# Patient Record
Sex: MALE | Race: WHITE | NOT HISPANIC OR LATINO | Employment: FULL TIME | URBAN - METROPOLITAN AREA
[De-identification: names, ages, dates, MRNs, and addresses within clinical notes are randomized per-mention and may not be internally consistent; named-entity substitution may affect disease eponyms.]

---

## 2021-03-30 ENCOUNTER — OFFICE VISIT (OUTPATIENT)
Dept: FAMILY MEDICINE | Facility: CLINIC | Age: 36
End: 2021-03-30
Payer: COMMERCIAL

## 2021-03-30 VITALS
HEIGHT: 70 IN | SYSTOLIC BLOOD PRESSURE: 170 MMHG | WEIGHT: 204 LBS | HEART RATE: 81 BPM | TEMPERATURE: 99 F | BODY MASS INDEX: 29.2 KG/M2 | DIASTOLIC BLOOD PRESSURE: 96 MMHG | OXYGEN SATURATION: 98 %

## 2021-03-30 DIAGNOSIS — F90.0 ATTENTION DEFICIT HYPERACTIVITY DISORDER (ADHD), PREDOMINANTLY INATTENTIVE TYPE: ICD-10-CM

## 2021-03-30 PROCEDURE — 99214 PR OFFICE/OUTPT VISIT, EST, LEVL IV, 30-39 MIN: ICD-10-PCS | Mod: S$GLB,,, | Performed by: FAMILY MEDICINE

## 2021-03-30 PROCEDURE — 99214 OFFICE O/P EST MOD 30 MIN: CPT | Mod: S$GLB,,, | Performed by: FAMILY MEDICINE

## 2021-03-30 RX ORDER — DEXTROAMPHETAMINE SACCHARATE, AMPHETAMINE ASPARTATE MONOHYDRATE, DEXTROAMPHETAMINE SULFATE AND AMPHETAMINE SULFATE 7.5; 7.5; 7.5; 7.5 MG/1; MG/1; MG/1; MG/1
30 CAPSULE, EXTENDED RELEASE ORAL EVERY MORNING
Qty: 30 CAPSULE | Refills: 0 | Status: SHIPPED | OUTPATIENT
Start: 2021-05-28 | End: 2021-05-21

## 2021-03-30 RX ORDER — LISINOPRIL 10 MG/1
10 TABLET ORAL DAILY
COMMUNITY
End: 2021-10-21 | Stop reason: SDUPTHER

## 2021-03-30 RX ORDER — DEXTROAMPHETAMINE SACCHARATE, AMPHETAMINE ASPARTATE MONOHYDRATE, DEXTROAMPHETAMINE SULFATE AND AMPHETAMINE SULFATE 7.5; 7.5; 7.5; 7.5 MG/1; MG/1; MG/1; MG/1
30 CAPSULE, EXTENDED RELEASE ORAL EVERY MORNING
Qty: 30 CAPSULE | Refills: 0 | Status: SHIPPED | OUTPATIENT
Start: 2021-04-29 | End: 2021-05-21

## 2021-03-30 RX ORDER — DEXTROAMPHETAMINE SACCHARATE, AMPHETAMINE ASPARTATE MONOHYDRATE, DEXTROAMPHETAMINE SULFATE AND AMPHETAMINE SULFATE 7.5; 7.5; 7.5; 7.5 MG/1; MG/1; MG/1; MG/1
30 CAPSULE, EXTENDED RELEASE ORAL EVERY MORNING
Qty: 30 CAPSULE | Refills: 0 | Status: SHIPPED | OUTPATIENT
Start: 2021-03-30 | End: 2021-04-01 | Stop reason: SDUPTHER

## 2021-03-30 RX ORDER — DEXTROAMPHETAMINE SACCHARATE, AMPHETAMINE ASPARTATE MONOHYDRATE, DEXTROAMPHETAMINE SULFATE AND AMPHETAMINE SULFATE 7.5; 7.5; 7.5; 7.5 MG/1; MG/1; MG/1; MG/1
30 CAPSULE, EXTENDED RELEASE ORAL EVERY MORNING
COMMUNITY
End: 2021-03-30 | Stop reason: SDUPTHER

## 2021-04-01 ENCOUNTER — TELEPHONE (OUTPATIENT)
Dept: FAMILY MEDICINE | Facility: CLINIC | Age: 36
End: 2021-04-01

## 2021-04-01 RX ORDER — DEXTROAMPHETAMINE SACCHARATE, AMPHETAMINE ASPARTATE MONOHYDRATE, DEXTROAMPHETAMINE SULFATE AND AMPHETAMINE SULFATE 7.5; 7.5; 7.5; 7.5 MG/1; MG/1; MG/1; MG/1
30 CAPSULE, EXTENDED RELEASE ORAL 2 TIMES DAILY
Qty: 60 CAPSULE | Refills: 0 | Status: SHIPPED | OUTPATIENT
Start: 2021-04-01 | End: 2021-05-21

## 2021-04-01 RX ORDER — DEXTROAMPHETAMINE SACCHARATE, AMPHETAMINE ASPARTATE MONOHYDRATE, DEXTROAMPHETAMINE SULFATE AND AMPHETAMINE SULFATE 7.5; 7.5; 7.5; 7.5 MG/1; MG/1; MG/1; MG/1
30 CAPSULE, EXTENDED RELEASE ORAL 2 TIMES DAILY
Qty: 60 CAPSULE | Refills: 0 | Status: SHIPPED | OUTPATIENT
Start: 2021-05-31 | End: 2021-05-21

## 2021-04-01 RX ORDER — DEXTROAMPHETAMINE SACCHARATE, AMPHETAMINE ASPARTATE MONOHYDRATE, DEXTROAMPHETAMINE SULFATE AND AMPHETAMINE SULFATE 7.5; 7.5; 7.5; 7.5 MG/1; MG/1; MG/1; MG/1
30 CAPSULE, EXTENDED RELEASE ORAL 2 TIMES DAILY
Qty: 60 CAPSULE | Refills: 0 | Status: SHIPPED | OUTPATIENT
Start: 2021-06-30 | End: 2021-05-21

## 2021-05-05 DIAGNOSIS — Z11.59 NEED FOR HEPATITIS C SCREENING TEST: ICD-10-CM

## 2021-05-20 ENCOUNTER — TELEPHONE (OUTPATIENT)
Dept: FAMILY MEDICINE | Facility: CLINIC | Age: 36
End: 2021-05-20

## 2021-05-21 RX ORDER — DEXTROAMPHETAMINE SACCHARATE, AMPHETAMINE ASPARTATE, DEXTROAMPHETAMINE SULFATE AND AMPHETAMINE SULFATE 7.5; 7.5; 7.5; 7.5 MG/1; MG/1; MG/1; MG/1
30 TABLET ORAL 2 TIMES DAILY
Qty: 60 TABLET | Refills: 0 | Status: SHIPPED | OUTPATIENT
Start: 2021-05-21 | End: 2021-06-30 | Stop reason: SDUPTHER

## 2021-06-24 ENCOUNTER — TELEPHONE (OUTPATIENT)
Dept: FAMILY MEDICINE | Facility: CLINIC | Age: 36
End: 2021-06-24

## 2021-06-30 ENCOUNTER — OFFICE VISIT (OUTPATIENT)
Dept: FAMILY MEDICINE | Facility: CLINIC | Age: 36
End: 2021-06-30
Payer: COMMERCIAL

## 2021-06-30 VITALS
TEMPERATURE: 98 F | DIASTOLIC BLOOD PRESSURE: 88 MMHG | WEIGHT: 202 LBS | HEART RATE: 76 BPM | BODY MASS INDEX: 28.92 KG/M2 | SYSTOLIC BLOOD PRESSURE: 138 MMHG | HEIGHT: 70 IN | OXYGEN SATURATION: 99 %

## 2021-06-30 DIAGNOSIS — F90.0 ATTENTION DEFICIT HYPERACTIVITY DISORDER (ADHD), PREDOMINANTLY INATTENTIVE TYPE: Primary | ICD-10-CM

## 2021-06-30 PROCEDURE — 99213 PR OFFICE/OUTPT VISIT, EST, LEVL III, 20-29 MIN: ICD-10-PCS | Mod: S$GLB,,, | Performed by: FAMILY MEDICINE

## 2021-06-30 PROCEDURE — 1126F PR PAIN SEVERITY QUANTIFIED, NO PAIN PRESENT: ICD-10-PCS | Mod: S$GLB,,, | Performed by: FAMILY MEDICINE

## 2021-06-30 PROCEDURE — 3008F BODY MASS INDEX DOCD: CPT | Mod: S$GLB,,, | Performed by: FAMILY MEDICINE

## 2021-06-30 PROCEDURE — 3008F PR BODY MASS INDEX (BMI) DOCUMENTED: ICD-10-PCS | Mod: S$GLB,,, | Performed by: FAMILY MEDICINE

## 2021-06-30 PROCEDURE — 1126F AMNT PAIN NOTED NONE PRSNT: CPT | Mod: S$GLB,,, | Performed by: FAMILY MEDICINE

## 2021-06-30 PROCEDURE — 99213 OFFICE O/P EST LOW 20 MIN: CPT | Mod: S$GLB,,, | Performed by: FAMILY MEDICINE

## 2021-06-30 RX ORDER — DEXTROAMPHETAMINE SACCHARATE, AMPHETAMINE ASPARTATE, DEXTROAMPHETAMINE SULFATE AND AMPHETAMINE SULFATE 7.5; 7.5; 7.5; 7.5 MG/1; MG/1; MG/1; MG/1
30 TABLET ORAL 2 TIMES DAILY
Qty: 60 TABLET | Refills: 0 | Status: SHIPPED | OUTPATIENT
Start: 2021-07-29 | End: 2021-07-01 | Stop reason: SDUPTHER

## 2021-06-30 RX ORDER — DEXTROAMPHETAMINE SACCHARATE, AMPHETAMINE ASPARTATE, DEXTROAMPHETAMINE SULFATE AND AMPHETAMINE SULFATE 7.5; 7.5; 7.5; 7.5 MG/1; MG/1; MG/1; MG/1
30 TABLET ORAL 2 TIMES DAILY
Qty: 60 TABLET | Refills: 0 | Status: SHIPPED | OUTPATIENT
Start: 2021-06-30 | End: 2021-07-01 | Stop reason: SDUPTHER

## 2021-06-30 RX ORDER — DEXTROAMPHETAMINE SACCHARATE, AMPHETAMINE ASPARTATE, DEXTROAMPHETAMINE SULFATE AND AMPHETAMINE SULFATE 7.5; 7.5; 7.5; 7.5 MG/1; MG/1; MG/1; MG/1
30 TABLET ORAL 2 TIMES DAILY
Qty: 60 TABLET | Refills: 0 | Status: SHIPPED | OUTPATIENT
Start: 2021-08-28 | End: 2021-07-01 | Stop reason: SDUPTHER

## 2021-07-01 ENCOUNTER — TELEPHONE (OUTPATIENT)
Dept: FAMILY MEDICINE | Facility: CLINIC | Age: 36
End: 2021-07-01

## 2021-07-01 RX ORDER — DEXTROAMPHETAMINE SACCHARATE, AMPHETAMINE ASPARTATE, DEXTROAMPHETAMINE SULFATE AND AMPHETAMINE SULFATE 7.5; 7.5; 7.5; 7.5 MG/1; MG/1; MG/1; MG/1
30 TABLET ORAL 2 TIMES DAILY
Qty: 60 TABLET | Refills: 0 | Status: SHIPPED | OUTPATIENT
Start: 2021-07-01 | End: 2021-10-21

## 2021-07-01 RX ORDER — DEXTROAMPHETAMINE SACCHARATE, AMPHETAMINE ASPARTATE, DEXTROAMPHETAMINE SULFATE AND AMPHETAMINE SULFATE 7.5; 7.5; 7.5; 7.5 MG/1; MG/1; MG/1; MG/1
30 TABLET ORAL 2 TIMES DAILY
Qty: 60 TABLET | Refills: 0 | Status: SHIPPED | OUTPATIENT
Start: 2021-07-29 | End: 2021-10-21

## 2021-07-01 RX ORDER — DEXTROAMPHETAMINE SACCHARATE, AMPHETAMINE ASPARTATE, DEXTROAMPHETAMINE SULFATE AND AMPHETAMINE SULFATE 7.5; 7.5; 7.5; 7.5 MG/1; MG/1; MG/1; MG/1
30 TABLET ORAL 2 TIMES DAILY
Qty: 60 TABLET | Refills: 0 | Status: SHIPPED | OUTPATIENT
Start: 2021-08-28 | End: 2021-10-21

## 2021-10-21 ENCOUNTER — OFFICE VISIT (OUTPATIENT)
Dept: FAMILY MEDICINE | Facility: CLINIC | Age: 36
End: 2021-10-21
Payer: COMMERCIAL

## 2021-10-21 VITALS
TEMPERATURE: 97 F | DIASTOLIC BLOOD PRESSURE: 92 MMHG | WEIGHT: 205 LBS | HEART RATE: 88 BPM | BODY MASS INDEX: 29.35 KG/M2 | HEIGHT: 70 IN | SYSTOLIC BLOOD PRESSURE: 146 MMHG | OXYGEN SATURATION: 98 %

## 2021-10-21 DIAGNOSIS — I10 ESSENTIAL HYPERTENSION, MALIGNANT: ICD-10-CM

## 2021-10-21 DIAGNOSIS — F90.0 ATTENTION DEFICIT HYPERACTIVITY DISORDER (ADHD), PREDOMINANTLY INATTENTIVE TYPE: Primary | ICD-10-CM

## 2021-10-21 PROCEDURE — 3077F PR MOST RECENT SYSTOLIC BLOOD PRESSURE >= 140 MM HG: ICD-10-PCS | Mod: S$GLB,,, | Performed by: FAMILY MEDICINE

## 2021-10-21 PROCEDURE — 3077F SYST BP >= 140 MM HG: CPT | Mod: S$GLB,,, | Performed by: FAMILY MEDICINE

## 2021-10-21 PROCEDURE — 3080F DIAST BP >= 90 MM HG: CPT | Mod: S$GLB,,, | Performed by: FAMILY MEDICINE

## 2021-10-21 PROCEDURE — 3008F BODY MASS INDEX DOCD: CPT | Mod: S$GLB,,, | Performed by: FAMILY MEDICINE

## 2021-10-21 PROCEDURE — 4010F PR ACE/ARB THEARPY RXD/TAKEN: ICD-10-PCS | Mod: S$GLB,,, | Performed by: FAMILY MEDICINE

## 2021-10-21 PROCEDURE — 99213 PR OFFICE/OUTPT VISIT, EST, LEVL III, 20-29 MIN: ICD-10-PCS | Mod: S$GLB,,, | Performed by: FAMILY MEDICINE

## 2021-10-21 PROCEDURE — 4010F ACE/ARB THERAPY RXD/TAKEN: CPT | Mod: S$GLB,,, | Performed by: FAMILY MEDICINE

## 2021-10-21 PROCEDURE — 3008F PR BODY MASS INDEX (BMI) DOCUMENTED: ICD-10-PCS | Mod: S$GLB,,, | Performed by: FAMILY MEDICINE

## 2021-10-21 PROCEDURE — 99213 OFFICE O/P EST LOW 20 MIN: CPT | Mod: S$GLB,,, | Performed by: FAMILY MEDICINE

## 2021-10-21 PROCEDURE — 3080F PR MOST RECENT DIASTOLIC BLOOD PRESSURE >= 90 MM HG: ICD-10-PCS | Mod: S$GLB,,, | Performed by: FAMILY MEDICINE

## 2021-10-21 RX ORDER — DEXTROAMPHETAMINE SACCHARATE, AMPHETAMINE ASPARTATE, DEXTROAMPHETAMINE SULFATE AND AMPHETAMINE SULFATE 7.5; 7.5; 7.5; 7.5 MG/1; MG/1; MG/1; MG/1
30 TABLET ORAL 2 TIMES DAILY
Qty: 60 TABLET | Refills: 0 | Status: SHIPPED | OUTPATIENT
Start: 2021-12-19 | End: 2022-02-22 | Stop reason: SDUPTHER

## 2021-10-21 RX ORDER — DEXTROAMPHETAMINE SACCHARATE, AMPHETAMINE ASPARTATE, DEXTROAMPHETAMINE SULFATE AND AMPHETAMINE SULFATE 7.5; 7.5; 7.5; 7.5 MG/1; MG/1; MG/1; MG/1
30 TABLET ORAL 2 TIMES DAILY
Qty: 60 TABLET | Refills: 0 | Status: SHIPPED | OUTPATIENT
Start: 2021-10-21 | End: 2022-02-22

## 2021-10-21 RX ORDER — DEXTROAMPHETAMINE SACCHARATE, AMPHETAMINE ASPARTATE, DEXTROAMPHETAMINE SULFATE AND AMPHETAMINE SULFATE 7.5; 7.5; 7.5; 7.5 MG/1; MG/1; MG/1; MG/1
30 TABLET ORAL 2 TIMES DAILY
Qty: 60 TABLET | Refills: 0 | Status: SHIPPED | OUTPATIENT
Start: 2021-11-20 | End: 2022-02-22

## 2021-10-21 RX ORDER — LISINOPRIL 10 MG/1
10 TABLET ORAL DAILY
Qty: 90 TABLET | Refills: 1 | Status: SHIPPED | OUTPATIENT
Start: 2021-10-21 | End: 2022-02-22 | Stop reason: SDUPTHER

## 2022-02-22 ENCOUNTER — OFFICE VISIT (OUTPATIENT)
Dept: FAMILY MEDICINE | Facility: CLINIC | Age: 37
End: 2022-02-22
Payer: COMMERCIAL

## 2022-02-22 VITALS
HEART RATE: 86 BPM | BODY MASS INDEX: 29.39 KG/M2 | WEIGHT: 205.31 LBS | OXYGEN SATURATION: 98 % | TEMPERATURE: 98 F | RESPIRATION RATE: 18 BRPM | DIASTOLIC BLOOD PRESSURE: 88 MMHG | SYSTOLIC BLOOD PRESSURE: 138 MMHG | HEIGHT: 70 IN

## 2022-02-22 DIAGNOSIS — I10 PRIMARY HYPERTENSION: ICD-10-CM

## 2022-02-22 DIAGNOSIS — F90.0 ATTENTION DEFICIT HYPERACTIVITY DISORDER (ADHD), PREDOMINANTLY INATTENTIVE TYPE: ICD-10-CM

## 2022-02-22 PROCEDURE — 1159F PR MEDICATION LIST DOCUMENTED IN MEDICAL RECORD: ICD-10-PCS | Mod: S$GLB,,, | Performed by: FAMILY MEDICINE

## 2022-02-22 PROCEDURE — 99214 OFFICE O/P EST MOD 30 MIN: CPT | Mod: S$GLB,,, | Performed by: FAMILY MEDICINE

## 2022-02-22 PROCEDURE — 3008F BODY MASS INDEX DOCD: CPT | Mod: S$GLB,,, | Performed by: FAMILY MEDICINE

## 2022-02-22 PROCEDURE — 3008F PR BODY MASS INDEX (BMI) DOCUMENTED: ICD-10-PCS | Mod: S$GLB,,, | Performed by: FAMILY MEDICINE

## 2022-02-22 PROCEDURE — 1159F MED LIST DOCD IN RCRD: CPT | Mod: S$GLB,,, | Performed by: FAMILY MEDICINE

## 2022-02-22 PROCEDURE — 3075F PR MOST RECENT SYSTOLIC BLOOD PRESS GE 130-139MM HG: ICD-10-PCS | Mod: S$GLB,,, | Performed by: FAMILY MEDICINE

## 2022-02-22 PROCEDURE — 3079F PR MOST RECENT DIASTOLIC BLOOD PRESSURE 80-89 MM HG: ICD-10-PCS | Mod: S$GLB,,, | Performed by: FAMILY MEDICINE

## 2022-02-22 PROCEDURE — 3075F SYST BP GE 130 - 139MM HG: CPT | Mod: S$GLB,,, | Performed by: FAMILY MEDICINE

## 2022-02-22 PROCEDURE — 99214 PR OFFICE/OUTPT VISIT, EST, LEVL IV, 30-39 MIN: ICD-10-PCS | Mod: S$GLB,,, | Performed by: FAMILY MEDICINE

## 2022-02-22 PROCEDURE — 3079F DIAST BP 80-89 MM HG: CPT | Mod: S$GLB,,, | Performed by: FAMILY MEDICINE

## 2022-02-22 RX ORDER — LISINOPRIL 10 MG/1
10 TABLET ORAL DAILY
Qty: 90 TABLET | Refills: 1 | Status: SHIPPED | OUTPATIENT
Start: 2022-02-22 | End: 2022-09-28 | Stop reason: SDUPTHER

## 2022-02-22 RX ORDER — DEXTROAMPHETAMINE SACCHARATE, AMPHETAMINE ASPARTATE, DEXTROAMPHETAMINE SULFATE AND AMPHETAMINE SULFATE 7.5; 7.5; 7.5; 7.5 MG/1; MG/1; MG/1; MG/1
30 TABLET ORAL 2 TIMES DAILY
Qty: 60 TABLET | Refills: 0 | Status: SHIPPED | OUTPATIENT
Start: 2022-04-22 | End: 2022-06-13 | Stop reason: SDUPTHER

## 2022-02-22 RX ORDER — DEXTROAMPHETAMINE SACCHARATE, AMPHETAMINE ASPARTATE, DEXTROAMPHETAMINE SULFATE AND AMPHETAMINE SULFATE 7.5; 7.5; 7.5; 7.5 MG/1; MG/1; MG/1; MG/1
30 TABLET ORAL 2 TIMES DAILY
Qty: 60 TABLET | Refills: 0 | Status: SHIPPED | OUTPATIENT
Start: 2022-03-22 | End: 2022-06-13 | Stop reason: SDUPTHER

## 2022-02-22 RX ORDER — DEXTROAMPHETAMINE SACCHARATE, AMPHETAMINE ASPARTATE, DEXTROAMPHETAMINE SULFATE AND AMPHETAMINE SULFATE 7.5; 7.5; 7.5; 7.5 MG/1; MG/1; MG/1; MG/1
30 TABLET ORAL 2 TIMES DAILY
Qty: 60 TABLET | Refills: 0 | Status: SHIPPED | OUTPATIENT
Start: 2022-02-22 | End: 2022-06-13 | Stop reason: SDUPTHER

## 2022-02-22 NOTE — PROGRESS NOTES
"  Chief Complaint   Patient presents with    Follow-up    Medication Refill        HPI:  36 male seen in October for routine ADD followup.  Doing well today with no report of insomnia or unintentional weight loss    Blood Pressure at goal on medication, with patient reporting prescription compliance      ROS: no acute issues to report    Vitals:    02/22/22 1435   BP: 138/88   BP Location: Left arm   Patient Position: Sitting   Pulse: 86   Resp: 18   Temp: 98.1 °F (36.7 °C)   SpO2: 98%   Weight: 93.1 kg (205 lb 4.8 oz)   Height: 5' 10" (1.778 m)      Body mass index is 29.46 kg/m².      General:  AOx3, well nourished and developed in no acute distress  Eyes:  PERRLA, EOMI, vision intact grossly  ENT:  normal hearing, moist oral mucosa  Neck:  trachea midline with no masses or thyromegaly  Heart:  RRR, no murmurs.  No edema noted, extremities warm and well perfused  Lungs:  clear to auscultation bilaterally with symmetric chest movement  Abdomen:  Soft, nontender, nondistended.  Normal bowel sounds  Musculoskeletal:  Normal gait.  Normal posture.  Normal muscular development with no joint swelling.  Neurological:  CN II-XII grossly intact. Symmetric strength and sensation  Psych:  Normal mood and affect.  Able to demonstrate good judgement and personal insight.      Assessment/Plan:    Attention deficit hyperactivity disorder (ADHD), predominantly inattentive type  -     dextroamphetamine-amphetamine (ADDERALL) 30 mg Tab; Take 1 tablet (30 mg total) by mouth 2 (two) times a day.  Dispense: 60 tablet; Refill: 0  -     dextroamphetamine-amphetamine (ADDERALL) 30 mg Tab; Take 1 tablet (30 mg total) by mouth 2 (two) times a day.  Dispense: 60 tablet; Refill: 0  -     dextroamphetamine-amphetamine (ADDERALL) 30 mg Tab; Take 1 tablet (30 mg total) by mouth 2 (two) times a day.  Dispense: 60 tablet; Refill: 0    Primary hypertension    Other orders  -     lisinopriL 10 MG tablet; Take 1 tablet (10 mg total) by mouth once " daily.  Dispense: 90 tablet; Refill: 1       ADD stable, filled medication, follow up 3 months for virtual visit    BP at goal, filled medication today

## 2022-05-27 DIAGNOSIS — F90.0 ATTENTION DEFICIT HYPERACTIVITY DISORDER (ADHD), PREDOMINANTLY INATTENTIVE TYPE: ICD-10-CM

## 2022-05-27 RX ORDER — DEXTROAMPHETAMINE SACCHARATE, AMPHETAMINE ASPARTATE, DEXTROAMPHETAMINE SULFATE AND AMPHETAMINE SULFATE 7.5; 7.5; 7.5; 7.5 MG/1; MG/1; MG/1; MG/1
30 TABLET ORAL 2 TIMES DAILY
Qty: 60 TABLET | Refills: 0 | OUTPATIENT
Start: 2022-05-27

## 2022-05-27 NOTE — TELEPHONE ENCOUNTER
----- Message from Hellen Caballero sent at 5/27/2022 11:01 AM CDT -----  Regarding: refill med  Contact: pt  Pt calling in regards to medication    Needs refill: dextroamphetamine-amphetamine (ADDERALL) 30 mg Tab      Pharmacy: Preferred : [facility] [place] [address], [phone]    Please call    Phone [phone]

## 2022-05-27 NOTE — TELEPHONE ENCOUNTER
Please review and advise: Thank you  Last office visit: 2/22/2022  ----- Message from Hellen Caballero sent at 5/27/2022 11:01 AM CDT -----  Regarding: refill med  Contact: pt  Pt calling in regards to medication    Needs refill: dextroamphetamine-amphetamine (ADDERALL) 30 mg Tab      Pharmacy: Preferred : [address] [place] [address], [phone]    Please call    Phone [phone]

## 2022-06-13 ENCOUNTER — OFFICE VISIT (OUTPATIENT)
Dept: FAMILY MEDICINE | Facility: CLINIC | Age: 37
End: 2022-06-13
Payer: COMMERCIAL

## 2022-06-13 VITALS
DIASTOLIC BLOOD PRESSURE: 98 MMHG | TEMPERATURE: 98 F | WEIGHT: 206 LBS | OXYGEN SATURATION: 98 % | SYSTOLIC BLOOD PRESSURE: 142 MMHG | HEIGHT: 70 IN | HEART RATE: 79 BPM | BODY MASS INDEX: 29.49 KG/M2

## 2022-06-13 DIAGNOSIS — F90.0 ATTENTION DEFICIT HYPERACTIVITY DISORDER (ADHD), PREDOMINANTLY INATTENTIVE TYPE: ICD-10-CM

## 2022-06-13 DIAGNOSIS — I10 PRIMARY HYPERTENSION: Primary | ICD-10-CM

## 2022-06-13 PROCEDURE — 1159F MED LIST DOCD IN RCRD: CPT | Mod: S$GLB,,, | Performed by: FAMILY MEDICINE

## 2022-06-13 PROCEDURE — 3008F PR BODY MASS INDEX (BMI) DOCUMENTED: ICD-10-PCS | Mod: S$GLB,,, | Performed by: FAMILY MEDICINE

## 2022-06-13 PROCEDURE — 3080F PR MOST RECENT DIASTOLIC BLOOD PRESSURE >= 90 MM HG: ICD-10-PCS | Mod: S$GLB,,, | Performed by: FAMILY MEDICINE

## 2022-06-13 PROCEDURE — 3077F PR MOST RECENT SYSTOLIC BLOOD PRESSURE >= 140 MM HG: ICD-10-PCS | Mod: S$GLB,,, | Performed by: FAMILY MEDICINE

## 2022-06-13 PROCEDURE — 3077F SYST BP >= 140 MM HG: CPT | Mod: S$GLB,,, | Performed by: FAMILY MEDICINE

## 2022-06-13 PROCEDURE — 99214 PR OFFICE/OUTPT VISIT, EST, LEVL IV, 30-39 MIN: ICD-10-PCS | Mod: S$GLB,,, | Performed by: FAMILY MEDICINE

## 2022-06-13 PROCEDURE — 1159F PR MEDICATION LIST DOCUMENTED IN MEDICAL RECORD: ICD-10-PCS | Mod: S$GLB,,, | Performed by: FAMILY MEDICINE

## 2022-06-13 PROCEDURE — 4010F PR ACE/ARB THEARPY RXD/TAKEN: ICD-10-PCS | Mod: S$GLB,,, | Performed by: FAMILY MEDICINE

## 2022-06-13 PROCEDURE — 99214 OFFICE O/P EST MOD 30 MIN: CPT | Mod: S$GLB,,, | Performed by: FAMILY MEDICINE

## 2022-06-13 PROCEDURE — 3080F DIAST BP >= 90 MM HG: CPT | Mod: S$GLB,,, | Performed by: FAMILY MEDICINE

## 2022-06-13 PROCEDURE — 3008F BODY MASS INDEX DOCD: CPT | Mod: S$GLB,,, | Performed by: FAMILY MEDICINE

## 2022-06-13 PROCEDURE — 4010F ACE/ARB THERAPY RXD/TAKEN: CPT | Mod: S$GLB,,, | Performed by: FAMILY MEDICINE

## 2022-06-13 RX ORDER — DEXTROAMPHETAMINE SACCHARATE, AMPHETAMINE ASPARTATE, DEXTROAMPHETAMINE SULFATE AND AMPHETAMINE SULFATE 7.5; 7.5; 7.5; 7.5 MG/1; MG/1; MG/1; MG/1
30 TABLET ORAL 2 TIMES DAILY
Qty: 60 TABLET | Refills: 0 | Status: SHIPPED | OUTPATIENT
Start: 2022-06-13 | End: 2022-12-29 | Stop reason: SDUPTHER

## 2022-06-13 RX ORDER — DEXTROAMPHETAMINE SACCHARATE, AMPHETAMINE ASPARTATE, DEXTROAMPHETAMINE SULFATE AND AMPHETAMINE SULFATE 7.5; 7.5; 7.5; 7.5 MG/1; MG/1; MG/1; MG/1
30 TABLET ORAL 2 TIMES DAILY
Qty: 60 TABLET | Refills: 0 | Status: SHIPPED | OUTPATIENT
Start: 2022-08-13 | End: 2022-09-28 | Stop reason: SDUPTHER

## 2022-06-13 RX ORDER — DEXTROAMPHETAMINE SACCHARATE, AMPHETAMINE ASPARTATE, DEXTROAMPHETAMINE SULFATE AND AMPHETAMINE SULFATE 7.5; 7.5; 7.5; 7.5 MG/1; MG/1; MG/1; MG/1
30 TABLET ORAL 2 TIMES DAILY
Qty: 60 TABLET | Refills: 0 | Status: SHIPPED | OUTPATIENT
Start: 2022-07-13 | End: 2022-12-29 | Stop reason: SDUPTHER

## 2022-06-13 NOTE — PROGRESS NOTES
"  Family Medicine Note  [facility] [place]    Chief Complaint   Patient presents with    Medication Refill        HPI:  Routine follow up for routine for chronic conditions.      ADD stable on medication - no issues with sleep, weight loss, or work performance    BP elevated on initial check    ROS: no acute issues to report    Vitals:    06/13/22 1115 06/13/22 1117   BP: (!) 168/110 (!) 142/98   BP Location: Left arm Right arm   Patient Position: Sitting Sitting   BP Method: Medium (Manual) Large (Manual)   Pulse: 79    Temp: 97.6 °F (36.4 °C)    TempSrc: Temporal    SpO2: 98%    Weight: 93.4 kg (206 lb)    Height: 5' 10" (1.778 m)       Body mass index is 29.56 kg/m².      General:  AOx3, well nourished and developed in no acute distress  Eyes:  PERRLA, EOMI, vision intact grossly  ENT:  normal hearing, moist oral mucosa  Neck:  trachea midline with no masses or thyromegaly  Heart:  RRR, no murmurs.  No edema noted, extremities warm and well perfused  Lungs:  clear to auscultation bilaterally with symmetric chest movement  Abdomen:  Soft, nontender, nondistended.  Normal bowel sounds  Musculoskeletal:  Normal gait.  Normal posture.  Normal muscular development with no joint swelling.  Neurological:  CN II-XII grossly intact. Symmetric strength and sensation  Psych:  Normal mood and affect.  Able to demonstrate good judgement and personal insight.      Assessment/Plan:    Primary hypertension    Attention deficit hyperactivity disorder (ADHD), predominantly inattentive type     1.  Slight elevated today, but historically at goal.  Continue to monitor  2.  Stable, filled medication today    Follow up 3 months        "

## 2022-09-14 DIAGNOSIS — I10 PRIMARY HYPERTENSION: ICD-10-CM

## 2022-09-28 ENCOUNTER — OFFICE VISIT (OUTPATIENT)
Dept: FAMILY MEDICINE | Facility: CLINIC | Age: 37
End: 2022-09-28
Payer: COMMERCIAL

## 2022-09-28 VITALS
DIASTOLIC BLOOD PRESSURE: 68 MMHG | BODY MASS INDEX: 29.92 KG/M2 | HEART RATE: 78 BPM | RESPIRATION RATE: 18 BRPM | TEMPERATURE: 98 F | SYSTOLIC BLOOD PRESSURE: 132 MMHG | WEIGHT: 209 LBS | OXYGEN SATURATION: 98 % | HEIGHT: 70 IN

## 2022-09-28 DIAGNOSIS — F90.0 ATTENTION DEFICIT HYPERACTIVITY DISORDER (ADHD), PREDOMINANTLY INATTENTIVE TYPE: ICD-10-CM

## 2022-09-28 DIAGNOSIS — I10 PRIMARY HYPERTENSION: Primary | ICD-10-CM

## 2022-09-28 PROCEDURE — 3078F DIAST BP <80 MM HG: CPT | Mod: S$GLB,,, | Performed by: FAMILY MEDICINE

## 2022-09-28 PROCEDURE — 3008F BODY MASS INDEX DOCD: CPT | Mod: S$GLB,,, | Performed by: FAMILY MEDICINE

## 2022-09-28 PROCEDURE — 4010F PR ACE/ARB THEARPY RXD/TAKEN: ICD-10-PCS | Mod: S$GLB,,, | Performed by: FAMILY MEDICINE

## 2022-09-28 PROCEDURE — 3008F PR BODY MASS INDEX (BMI) DOCUMENTED: ICD-10-PCS | Mod: S$GLB,,, | Performed by: FAMILY MEDICINE

## 2022-09-28 PROCEDURE — 1159F MED LIST DOCD IN RCRD: CPT | Mod: S$GLB,,, | Performed by: FAMILY MEDICINE

## 2022-09-28 PROCEDURE — 1159F PR MEDICATION LIST DOCUMENTED IN MEDICAL RECORD: ICD-10-PCS | Mod: S$GLB,,, | Performed by: FAMILY MEDICINE

## 2022-09-28 PROCEDURE — 99213 PR OFFICE/OUTPT VISIT, EST, LEVL III, 20-29 MIN: ICD-10-PCS | Mod: S$GLB,,, | Performed by: FAMILY MEDICINE

## 2022-09-28 PROCEDURE — 99213 OFFICE O/P EST LOW 20 MIN: CPT | Mod: S$GLB,,, | Performed by: FAMILY MEDICINE

## 2022-09-28 PROCEDURE — 3078F PR MOST RECENT DIASTOLIC BLOOD PRESSURE < 80 MM HG: ICD-10-PCS | Mod: S$GLB,,, | Performed by: FAMILY MEDICINE

## 2022-09-28 PROCEDURE — 3075F PR MOST RECENT SYSTOLIC BLOOD PRESS GE 130-139MM HG: ICD-10-PCS | Mod: S$GLB,,, | Performed by: FAMILY MEDICINE

## 2022-09-28 PROCEDURE — 4010F ACE/ARB THERAPY RXD/TAKEN: CPT | Mod: S$GLB,,, | Performed by: FAMILY MEDICINE

## 2022-09-28 PROCEDURE — 3075F SYST BP GE 130 - 139MM HG: CPT | Mod: S$GLB,,, | Performed by: FAMILY MEDICINE

## 2022-09-28 RX ORDER — DEXTROAMPHETAMINE SACCHARATE, AMPHETAMINE ASPARTATE, DEXTROAMPHETAMINE SULFATE AND AMPHETAMINE SULFATE 7.5; 7.5; 7.5; 7.5 MG/1; MG/1; MG/1; MG/1
30 TABLET ORAL 2 TIMES DAILY
Qty: 60 TABLET | Refills: 0 | Status: SHIPPED | OUTPATIENT
Start: 2022-09-28 | End: 2022-12-29 | Stop reason: SDUPTHER

## 2022-09-28 RX ORDER — DEXTROAMPHETAMINE SACCHARATE, AMPHETAMINE ASPARTATE, DEXTROAMPHETAMINE SULFATE AND AMPHETAMINE SULFATE 7.5; 7.5; 7.5; 7.5 MG/1; MG/1; MG/1; MG/1
30 TABLET ORAL 2 TIMES DAILY
Qty: 60 TABLET | Refills: 0 | Status: SHIPPED | OUTPATIENT
Start: 2022-11-28 | End: 2022-12-29 | Stop reason: SDUPTHER

## 2022-09-28 RX ORDER — DEXTROAMPHETAMINE SACCHARATE, AMPHETAMINE ASPARTATE, DEXTROAMPHETAMINE SULFATE AND AMPHETAMINE SULFATE 7.5; 7.5; 7.5; 7.5 MG/1; MG/1; MG/1; MG/1
30 TABLET ORAL 2 TIMES DAILY
Qty: 60 TABLET | Refills: 0 | Status: SHIPPED | OUTPATIENT
Start: 2022-10-28 | End: 2022-12-29 | Stop reason: SDUPTHER

## 2022-09-28 RX ORDER — LISINOPRIL 10 MG/1
10 TABLET ORAL DAILY
Qty: 90 TABLET | Refills: 3 | Status: SHIPPED | OUTPATIENT
Start: 2022-09-28

## 2022-09-28 NOTE — PROGRESS NOTES
"  Family Medicine Note  [facility]    Chief Complaint   Patient presents with    Follow-up     3 month    Medication Refill        HPI:    Blood Pressure at goal on medication, with patient reporting prescription compliance  ADD stable on medication - weight stable    ROS: no acute issues today    Vitals:    09/28/22 0837   BP: 132/68   BP Location: Left arm   Patient Position: Sitting   BP Method: Medium (Manual)   Pulse: 78   Resp: 18   Temp: 97.9 °F (36.6 °C)   TempSrc: Temporal   SpO2: 98%   Weight: 94.8 kg (209 lb)   Height: 5' 10" (1.778 m)      Body mass index is 29.99 kg/m².      General:  AOx3, well nourished and developed in no acute distress  Eyes:  PERRLA, EOMI, vision intact grossly  ENT:  normal hearing, moist oral mucosa  Neck:  trachea midline with no masses or thyromegaly  Heart:  RRR, no murmurs.  No edema noted, extremities warm and well perfused  Lungs:  clear to auscultation bilaterally with symmetric chest movement  Abdomen:  Soft, nontender, nondistended.  Normal bowel sounds  Musculoskeletal:  Normal gait.  Normal posture.  Normal muscular development with no joint swelling.  Neurological:  CN II-XII grossly intact. Symmetric strength and sensation  Psych:  Normal mood and affect.  Able to demonstrate good judgement and personal insight.      Assessment/Plan:    Primary hypertension    Attention deficit hyperactivity disorder (ADHD), predominantly inattentive type      At goal  Stable on medication      "

## 2022-12-29 ENCOUNTER — OFFICE VISIT (OUTPATIENT)
Dept: FAMILY MEDICINE | Facility: CLINIC | Age: 37
End: 2022-12-29
Payer: COMMERCIAL

## 2022-12-29 VITALS
TEMPERATURE: 98 F | WEIGHT: 212.31 LBS | DIASTOLIC BLOOD PRESSURE: 68 MMHG | HEIGHT: 70 IN | OXYGEN SATURATION: 98 % | BODY MASS INDEX: 30.39 KG/M2 | SYSTOLIC BLOOD PRESSURE: 132 MMHG | HEART RATE: 82 BPM

## 2022-12-29 DIAGNOSIS — F90.0 ATTENTION DEFICIT HYPERACTIVITY DISORDER (ADHD), PREDOMINANTLY INATTENTIVE TYPE: ICD-10-CM

## 2022-12-29 DIAGNOSIS — I10 PRIMARY HYPERTENSION: Primary | ICD-10-CM

## 2022-12-29 PROCEDURE — 99999 PR PBB SHADOW E&M-EST. PATIENT-LVL III: ICD-10-PCS | Mod: PBBFAC,,, | Performed by: FAMILY MEDICINE

## 2022-12-29 PROCEDURE — 99999 PR PBB SHADOW E&M-EST. PATIENT-LVL III: CPT | Mod: PBBFAC,,, | Performed by: FAMILY MEDICINE

## 2022-12-29 PROCEDURE — 3008F PR BODY MASS INDEX (BMI) DOCUMENTED: ICD-10-PCS | Mod: S$GLB,,, | Performed by: FAMILY MEDICINE

## 2022-12-29 PROCEDURE — 99214 PR OFFICE/OUTPT VISIT, EST, LEVL IV, 30-39 MIN: ICD-10-PCS | Mod: S$GLB,,, | Performed by: FAMILY MEDICINE

## 2022-12-29 PROCEDURE — 4010F PR ACE/ARB THEARPY RXD/TAKEN: ICD-10-PCS | Mod: S$GLB,,, | Performed by: FAMILY MEDICINE

## 2022-12-29 PROCEDURE — 3075F SYST BP GE 130 - 139MM HG: CPT | Mod: S$GLB,,, | Performed by: FAMILY MEDICINE

## 2022-12-29 PROCEDURE — 1159F MED LIST DOCD IN RCRD: CPT | Mod: S$GLB,,, | Performed by: FAMILY MEDICINE

## 2022-12-29 PROCEDURE — 3075F PR MOST RECENT SYSTOLIC BLOOD PRESS GE 130-139MM HG: ICD-10-PCS | Mod: S$GLB,,, | Performed by: FAMILY MEDICINE

## 2022-12-29 PROCEDURE — 99214 OFFICE O/P EST MOD 30 MIN: CPT | Mod: S$GLB,,, | Performed by: FAMILY MEDICINE

## 2022-12-29 PROCEDURE — 1159F PR MEDICATION LIST DOCUMENTED IN MEDICAL RECORD: ICD-10-PCS | Mod: S$GLB,,, | Performed by: FAMILY MEDICINE

## 2022-12-29 PROCEDURE — 3078F PR MOST RECENT DIASTOLIC BLOOD PRESSURE < 80 MM HG: ICD-10-PCS | Mod: S$GLB,,, | Performed by: FAMILY MEDICINE

## 2022-12-29 PROCEDURE — 4010F ACE/ARB THERAPY RXD/TAKEN: CPT | Mod: S$GLB,,, | Performed by: FAMILY MEDICINE

## 2022-12-29 PROCEDURE — 3008F BODY MASS INDEX DOCD: CPT | Mod: S$GLB,,, | Performed by: FAMILY MEDICINE

## 2022-12-29 PROCEDURE — 3078F DIAST BP <80 MM HG: CPT | Mod: S$GLB,,, | Performed by: FAMILY MEDICINE

## 2022-12-29 RX ORDER — DEXTROAMPHETAMINE SACCHARATE, AMPHETAMINE ASPARTATE, DEXTROAMPHETAMINE SULFATE AND AMPHETAMINE SULFATE 7.5; 7.5; 7.5; 7.5 MG/1; MG/1; MG/1; MG/1
30 TABLET ORAL 2 TIMES DAILY
Qty: 60 TABLET | Refills: 0 | Status: SHIPPED | OUTPATIENT
Start: 2022-12-29 | End: 2023-06-20

## 2022-12-29 RX ORDER — DEXTROAMPHETAMINE SACCHARATE, AMPHETAMINE ASPARTATE, DEXTROAMPHETAMINE SULFATE AND AMPHETAMINE SULFATE 7.5; 7.5; 7.5; 7.5 MG/1; MG/1; MG/1; MG/1
30 TABLET ORAL 2 TIMES DAILY
Qty: 60 TABLET | Refills: 0 | Status: SHIPPED | OUTPATIENT
Start: 2023-01-29 | End: 2022-12-29

## 2022-12-29 RX ORDER — DEXTROAMPHETAMINE SACCHARATE, AMPHETAMINE ASPARTATE, DEXTROAMPHETAMINE SULFATE AND AMPHETAMINE SULFATE 7.5; 7.5; 7.5; 7.5 MG/1; MG/1; MG/1; MG/1
30 TABLET ORAL 2 TIMES DAILY
Qty: 60 TABLET | Refills: 0 | Status: SHIPPED | OUTPATIENT
Start: 2022-12-29 | End: 2022-12-29

## 2022-12-29 RX ORDER — DEXTROAMPHETAMINE SACCHARATE, AMPHETAMINE ASPARTATE, DEXTROAMPHETAMINE SULFATE AND AMPHETAMINE SULFATE 7.5; 7.5; 7.5; 7.5 MG/1; MG/1; MG/1; MG/1
30 TABLET ORAL 2 TIMES DAILY
Qty: 60 TABLET | Refills: 0 | Status: SHIPPED | OUTPATIENT
Start: 2023-02-28 | End: 2022-12-29

## 2022-12-29 RX ORDER — DEXTROAMPHETAMINE SACCHARATE, AMPHETAMINE ASPARTATE, DEXTROAMPHETAMINE SULFATE AND AMPHETAMINE SULFATE 7.5; 7.5; 7.5; 7.5 MG/1; MG/1; MG/1; MG/1
30 TABLET ORAL 2 TIMES DAILY
Qty: 60 TABLET | Refills: 0 | Status: SHIPPED | OUTPATIENT
Start: 2023-02-28 | End: 2023-01-31

## 2022-12-29 RX ORDER — DEXTROAMPHETAMINE SACCHARATE, AMPHETAMINE ASPARTATE, DEXTROAMPHETAMINE SULFATE AND AMPHETAMINE SULFATE 7.5; 7.5; 7.5; 7.5 MG/1; MG/1; MG/1; MG/1
30 TABLET ORAL 2 TIMES DAILY
Qty: 60 TABLET | Refills: 0 | Status: SHIPPED | OUTPATIENT
Start: 2023-01-29 | End: 2023-01-31

## 2022-12-29 NOTE — PROGRESS NOTES
"  Family Medicine Note  [facility]    Chief Complaint   Patient presents with    Medication Refill        HPI:  ADHD follow up - stable on medication  Blood Pressure at goal on medication, with patient reporting prescription compliance      ROS: as above    Vitals:    12/29/22 0821   BP: 132/68   BP Location: Right arm   Patient Position: Sitting   BP Method: Medium (Manual)   Pulse: 82   Temp: 97.8 °F (36.6 °C)   TempSrc: Tympanic   SpO2: 98%   Weight: 96.3 kg (212 lb 4.9 oz)   Height: 5' 10" (1.778 m)      Body mass index is 30.46 kg/m².      General:  AOx3, well nourished and developed in no acute distress  Eyes:  PERRLA, EOMI, vision intact grossly  ENT:  normal hearing, moist oral mucosa  Neck:  trachea midline with no masses or thyromegaly  Heart:  RRR, no murmurs.  No edema noted, extremities warm and well perfused  Lungs:  clear to auscultation bilaterally with symmetric chest movement  Abdomen:  Soft, nontender, nondistended.  Normal bowel sounds  Musculoskeletal:  Normal gait.  Normal posture.  Normal muscular development with no joint swelling.  Neurological:  CN II-XII grossly intact. Symmetric strength and sensation  Psych:  Normal mood and affect.  Able to demonstrate good judgement and personal insight.      Assessment/Plan:    Primary hypertension    Attention deficit hyperactivity disorder (ADHD), predominantly inattentive type        At goal, continue therapy  Printed scripts today, continue therapy      "

## 2023-01-26 ENCOUNTER — TELEPHONE (OUTPATIENT)
Dept: FAMILY MEDICINE | Facility: CLINIC | Age: 38
End: 2023-01-26

## 2023-01-26 NOTE — TELEPHONE ENCOUNTER
----- Message from Colt Suarez MD sent at 1/26/2023  8:13 AM CST -----  Regarding: RE: Alternate Medication  Have patient call insurance to see what alternatives are covered by insurance (formulary).  I will fill covered alternative  ----- Message -----  From: Remy Fu LPN  Sent: 1/25/2023   3:48 PM CST  To: Colt Suarez MD  Subject: Alternate Medication                             Please see message below.        Who Called: Gaston/ PT   Best Call Back Number: [phone]   Additional  Information:PT asking for a  call back to  discuss getting prescribed an alternate medication to Adderall. He has called around to different pharmacies no of them has it in stock   Please Advise- Thank you          n/a

## 2023-01-31 ENCOUNTER — TELEPHONE (OUTPATIENT)
Dept: FAMILY MEDICINE | Facility: CLINIC | Age: 38
End: 2023-01-31

## 2023-01-31 RX ORDER — METHYLPHENIDATE HYDROCHLORIDE 10 MG/1
10 TABLET ORAL 2 TIMES DAILY
Qty: 60 TABLET | Refills: 0 | Status: SHIPPED | OUTPATIENT
Start: 2023-01-31 | End: 2023-06-20 | Stop reason: SDUPTHER

## 2023-01-31 NOTE — TELEPHONE ENCOUNTER
----- Message from Remy Fu LPN sent at 1/26/2023  3:47 PM CST -----  Regarding: Alternate Medication  Type: Needs Medical Advice     Who Called:  pt   Best Call Back Number: [phone]   Additional Information: Requesting a call back regarding pt calling back with medication he can get instead of his adderall:       1. Generic Ritalin   2. Focalin rocío     Send to this pharm he asked       [facility] [address] [place] [address]   Phone: [phone] Fax: [phone]

## 2023-01-31 NOTE — TELEPHONE ENCOUNTER
----- Message from Colt Suarez MD sent at 1/31/2023 10:30 AM CST -----  Regarding: RE: Alternate Medication  Sent replacement to pharmacy  ----- Message -----  From: Remy Fu LPN  Sent: 1/26/2023   3:47 PM CST  To: Colt Suarez MD  Subject: Alternate Medication                             Type: Needs Medical Advice     Who Called:  pt   Best Call Back Number: [phone]   Additional Information: Requesting a call back regarding pt calling back with medication he can get instead of his adderall:       1. Generic Ritalin   2. Focalin rocío     Send to this pharm he asked       [facility] [address] [place] [address]   Phone: [phone] Fax: [phone]

## 2023-05-31 DIAGNOSIS — F90.0 ATTENTION DEFICIT HYPERACTIVITY DISORDER (ADHD), PREDOMINANTLY INATTENTIVE TYPE: ICD-10-CM

## 2023-05-31 RX ORDER — METHYLPHENIDATE HYDROCHLORIDE 10 MG/1
10 TABLET ORAL 2 TIMES DAILY
Qty: 60 TABLET | Refills: 0 | OUTPATIENT
Start: 2023-05-31

## 2023-05-31 NOTE — TELEPHONE ENCOUNTER
----- Message from Nafisa Ng sent at 5/31/2023 11:30 AM CDT -----  Contact: patient  Type:  RX Refill Request    Who Called:  patient  Refill or New Rx:  refill  RX Name and Strength:  methylphenidate HCl (RITALIN) 10 MG tablet  How is the patient currently taking it? (ex. 1XDay):  as directed  Is this a 30 day or 90 day RX:  30  Preferred Pharmacy with phone number:    [facility] [address] [address]  Phone: [phone] Fax: [phone]  Local or Mail Order:  local  Ordering Provider:  samia Merrill Call Back Number:  [phone] (home)   Additional Information:

## 2023-06-20 ENCOUNTER — OFFICE VISIT (OUTPATIENT)
Dept: FAMILY MEDICINE | Facility: CLINIC | Age: 38
End: 2023-06-20

## 2023-06-20 DIAGNOSIS — F90.0 ATTENTION DEFICIT HYPERACTIVITY DISORDER (ADHD), PREDOMINANTLY INATTENTIVE TYPE: Primary | ICD-10-CM

## 2023-06-20 PROCEDURE — 99213 PR OFFICE/OUTPT VISIT, EST, LEVL III, 20-29 MIN: ICD-10-PCS | Mod: 95,,, | Performed by: FAMILY MEDICINE

## 2023-06-20 PROCEDURE — 99213 OFFICE O/P EST LOW 20 MIN: CPT | Mod: 95,,, | Performed by: FAMILY MEDICINE

## 2023-06-20 RX ORDER — METHYLPHENIDATE HYDROCHLORIDE 10 MG/1
10 TABLET ORAL 2 TIMES DAILY
Qty: 60 TABLET | Refills: 0 | Status: SHIPPED | OUTPATIENT
Start: 2023-06-20 | End: 2023-06-29 | Stop reason: SDUPTHER

## 2023-06-20 NOTE — PROGRESS NOTES
Chief Complaint   Patient presents with    Medication Refill        HPI: Virtual visit for med refill.  On ritalin, as adderall not available, and does well on this with no weight loss or insomnia    ROS: as above      General:  AOx3, well nourished and developed in no acute distress  Eyes:  PERRLA, EOMI, vision intact grossly  Neck:  trachea midline with no masses or thyromegaly  Musculoskeletal:  Normal posture.  Normal muscular development.  Neurological:  CN II-XII grossly intact based off of video interaction  Psych:  Normal mood and affect.  Able to demonstrate good judgement and personal insight.      Assessment/Plan:    Attention deficit hyperactivity disorder (ADHD), predominantly inattentive type    Other orders  -     methylphenidate HCl (RITALIN) 10 MG tablet; Take 1 tablet (10 mg total) by mouth 2 (two) times daily.  Dispense: 60 tablet; Refill: 0     Continue medication as directed.      The patient location is: work  The chief complaint leading to consultation is: med refill    Visit type: audiovisual    Face to Face time with patient: 15  15 minutes of total time spent on the encounter, which includes face to face time and non-face to face time preparing to see the patient (eg, review of tests), Obtaining and/or reviewing separately obtained history, Documenting clinical information in the electronic or other health record, Independently interpreting results (not separately reported) and communicating results to the patient/family/caregiver, or Care coordination (not separately reported).         Each patient to whom he or she provides medical services by telemedicine is:  (1) informed of the relationship between the physician and patient and the respective role of any other health care provider with respect to management of the patient; and (2) notified that he or she may decline to receive medical services by telemedicine and may withdraw from such care at any time.    Notes:

## 2023-06-28 ENCOUNTER — TELEPHONE (OUTPATIENT)
Dept: FAMILY MEDICINE | Facility: CLINIC | Age: 38
End: 2023-06-28

## 2023-06-28 NOTE — TELEPHONE ENCOUNTER
----- Message from Jenny Carmona sent at 6/28/2023  4:43 PM CDT -----  Type: Patient Call Back         Who called: Pt          What is the request in detail: Pt called in regarding requesting a new Rx for the Adderall 30 Mg  to be sent to [facility] [address] [place] [address], [place] - [address]         Can the clinic reply by [facility]?no          Would the patient rather a call back or a response via [facility]? Call back          Best call back number: [phone] (mobile)          Additional Information:           Thank You

## 2023-06-28 NOTE — TELEPHONE ENCOUNTER
Call placed to patient due to message left, currently not available unable to leave message due to wireless not receiving messages.    ----- Message from Lyla Townsend sent at 6/28/2023  4:55 PM CDT -----  Contact: pt [phone]  Type:  Patient Returning Call    Who Called:  Pt   Who Left Message for Patient:  Huber   Does the patient know what this is regarding?:  Adderall rx   Best Call Back Number:  [phone]    Additional Information:  Pt upset states he want to speak with some one now because his rx is not at [facility]. Pls call back and advise

## 2023-06-29 ENCOUNTER — TELEPHONE (OUTPATIENT)
Dept: FAMILY MEDICINE | Facility: CLINIC | Age: 38
End: 2023-06-29

## 2023-06-29 RX ORDER — METHYLPHENIDATE HYDROCHLORIDE 10 MG/1
10 TABLET ORAL 2 TIMES DAILY
Qty: 60 TABLET | Refills: 0 | Status: SHIPPED | OUTPATIENT
Start: 2023-06-29 | End: 2023-07-31 | Stop reason: SDUPTHER

## 2023-06-29 NOTE — TELEPHONE ENCOUNTER
Called [facility] [place]. The ritalin is on backorder but they do Adderall 10 mg. The Ritalin was canceled. Per Leeann ( Pharmacist) Can you call In the Adderall?

## 2023-06-29 NOTE — TELEPHONE ENCOUNTER
----- Message from Greer Parker sent at 6/29/2023  8:02 AM CDT -----  Contact: PT  Type:  RX Refill Request    Who Called: Gaston/ PT  Refill or New Rx: New RX  RX Name and Strength:  Adderall 30MG Tablet XR  How is the patient currently taking it? (ex. 1XDay):Directed   Is this a 30 day or 90 day RX: 30  Preferred Pharmacy with phone number:    [facility] [address] [place] [address], [place] - [address] [address]  Phone: [phone] Fax: [phone]      Best Call Back Number:  [phone]  Additional Information: PT said pharm  has medication in stock, RITALIN) 10 MG tablet, was never picked up due to  pharm being out of stock

## 2023-06-29 NOTE — TELEPHONE ENCOUNTER
----- Message from Makenna Mora sent at 6/29/2023 11:46 AM CDT -----  Contact: pt  Type:  Needs Medical Advice    Who Called: Pt  Would the patient rather a call back or a response via [facility]? call  Best Call Back Number: [phone]  Additional Information: Pt states that he need a callback as soon as possible. States that he still hasn't gotten his rx and wants to know what s going on. Please advise thank you

## 2023-06-30 RX ORDER — DEXTROAMPHETAMINE SACCHARATE, AMPHETAMINE ASPARTATE, DEXTROAMPHETAMINE SULFATE AND AMPHETAMINE SULFATE 7.5; 7.5; 7.5; 7.5 MG/1; MG/1; MG/1; MG/1
30 TABLET ORAL 2 TIMES DAILY
Qty: 60 TABLET | Refills: 0 | Status: SHIPPED | OUTPATIENT
Start: 2023-06-30 | End: 2023-08-02 | Stop reason: SDUPTHER

## 2023-07-03 ENCOUNTER — TELEPHONE (OUTPATIENT)
Dept: PRIMARY CARE CLINIC | Facility: CLINIC | Age: 38
End: 2023-07-03

## 2023-07-03 NOTE — TELEPHONE ENCOUNTER
----- Message from Dioni Rodriguez sent at 6/30/2023  9:42 AM CDT -----  Type: Needs Medical Advice  Who Called:  pt  Symptoms (please be specific):  pt said he been without his dextroamphetamine-amphetamine (ADDERALL) 30 mg Tab  for 6 months and now his pharmacy have it--said he been calling to get it refilled before they run out--he need to speak to the nurse--please call and advise    Pharmacy name and phone #:    [facility] [address] [place] [address], [place] - [address] [address]  Phone: [phone] Fax: [phone]  Best Call Back Number: [phone] (home)     Additional Information: thank you

## 2023-07-31 ENCOUNTER — TELEPHONE (OUTPATIENT)
Dept: FAMILY MEDICINE | Facility: CLINIC | Age: 38
End: 2023-07-31
Payer: COMMERCIAL

## 2023-07-31 RX ORDER — METHYLPHENIDATE HYDROCHLORIDE 10 MG/1
10 TABLET ORAL 2 TIMES DAILY
Qty: 60 TABLET | Refills: 0 | Status: SHIPPED | OUTPATIENT
Start: 2023-07-31 | End: 2023-09-18

## 2023-07-31 NOTE — TELEPHONE ENCOUNTER
----- Message from Dioni Rodrigeuz sent at 7/31/2023 11:26 AM CDT -----  Type: Needs Medical Advice  Who Called:  pt   Symptoms (please be specific):  pt said he need to speak to the office-- said the dr sent a rx for his methylphenidate HCl (RITALIN) 10 MG tablet and he do not want that one said he need his dextroamphetamine-amphetamine 30 mg Tab sent in to the pharmacy listed below--please call and advise--  [facility] and phone #:    [facility], [place] - [address] [address]  Phone: [phone] Fax: [phone]  Best Call Back Number: [phone] (home)     Additional Information: thank you

## 2023-07-31 NOTE — TELEPHONE ENCOUNTER
----- Message from Amita Durham sent at 7/31/2023 10:11 AM CDT -----  Contact: PT  Type:  RX Refill Request    Who Called: PT  Refill or New Rx: REFILL   RX Name and Strength: dextroamphetamine-amphetamine 30 mg Tab  How is the patient currently taking it? (ex. 1XDay): ONE TABLET TWICE A DAY   Is this a 30 day or 90 day RX: 90  Preferred Pharmacy with phone number:  [facility] [place], [place] - [address] [address]  Phone: [phone] Fax: [phone]  Local or Mail Order:LOCAL  Ordering Provider: RENNY  Would the patient rather a call back or a response via [facility]? CALL   Best Call Back Number: [phone] (home)     Additional Information: THANK YOU

## 2023-08-02 RX ORDER — DEXTROAMPHETAMINE SACCHARATE, AMPHETAMINE ASPARTATE, DEXTROAMPHETAMINE SULFATE AND AMPHETAMINE SULFATE 7.5; 7.5; 7.5; 7.5 MG/1; MG/1; MG/1; MG/1
30 TABLET ORAL 2 TIMES DAILY
Qty: 60 TABLET | Refills: 0 | Status: SHIPPED | OUTPATIENT
Start: 2023-08-02 | End: 2023-09-05 | Stop reason: SDUPTHER

## 2023-08-02 RX ORDER — DEXTROAMPHETAMINE SACCHARATE, AMPHETAMINE ASPARTATE, DEXTROAMPHETAMINE SULFATE AND AMPHETAMINE SULFATE 7.5; 7.5; 7.5; 7.5 MG/1; MG/1; MG/1; MG/1
30 TABLET ORAL 2 TIMES DAILY
Qty: 60 TABLET | Refills: 0 | Status: CANCELLED | OUTPATIENT
Start: 2023-08-02

## 2023-08-02 NOTE — TELEPHONE ENCOUNTER
----- Message from Lashae Kang sent at 8/2/2023  9:11 AM CDT -----  Type:  RX Refill Request    Who Called:  pt   Refill or New Rx:  refill  RX Name and Strength:  dextroamphetamine-amphetamine 30 mg Tab  How is the patient currently taking it? (ex. 1XDay):  as direct  Is this a 30 day or 90 day RX:  30   Preferred Pharmacy with phone number:    [facility], [place] - [address] [address]  Phone: [phone] Fax: [phone]    Local or Mail Order:  local   Ordering Provider:  samia Merrill Call Back Number:  [phone]    Additional Information:  please advise

## 2023-09-05 RX ORDER — DEXTROAMPHETAMINE SACCHARATE, AMPHETAMINE ASPARTATE, DEXTROAMPHETAMINE SULFATE AND AMPHETAMINE SULFATE 7.5; 7.5; 7.5; 7.5 MG/1; MG/1; MG/1; MG/1
30 TABLET ORAL 2 TIMES DAILY
Qty: 60 TABLET | Refills: 0 | Status: SHIPPED | OUTPATIENT
Start: 2023-09-05 | End: 2023-09-18 | Stop reason: SDUPTHER

## 2023-09-05 NOTE — TELEPHONE ENCOUNTER
LOV: 6/202/2023  ----- Message from Carriejasiel Parkerariel sent at 9/5/2023 11:41 AM CDT -----  Regarding: RX Refill Request  Contact: patient at [phone]  Type:  RX Refill Request    Who Called:  patient at [phone]    Preferred Pharmacy with phone number:    [facility], [place] - [address] [address]  Phone: [phone] Fax: [phone]    Additional Information:  patient is calling for a refill of medication. Please call and advise. Thank you    dextroamphetamine-amphetamine 30 mg Tab 60 tablet 0 8/2/2023    Sig - Route: Take 1 tablet (30 mg total) by mouth 2 (two) times a day. - Oral   Sent to pharmacy as: dextroamphetamine-amphetamine 30 mg Tab   Earliest Fill Date: 8/2/2023   E-Prescribing Status: Receipt confirmed by pharmacy (8/2/2023 11:00 AM CDT)

## 2023-09-18 ENCOUNTER — OFFICE VISIT (OUTPATIENT)
Dept: FAMILY MEDICINE | Facility: CLINIC | Age: 38
End: 2023-09-18
Payer: COMMERCIAL

## 2023-09-18 VITALS
HEART RATE: 71 BPM | SYSTOLIC BLOOD PRESSURE: 160 MMHG | DIASTOLIC BLOOD PRESSURE: 78 MMHG | WEIGHT: 204 LBS | BODY MASS INDEX: 29.2 KG/M2 | TEMPERATURE: 98 F | HEIGHT: 70 IN | OXYGEN SATURATION: 98 %

## 2023-09-18 DIAGNOSIS — F90.0 ATTENTION DEFICIT HYPERACTIVITY DISORDER (ADHD), PREDOMINANTLY INATTENTIVE TYPE: Primary | ICD-10-CM

## 2023-09-18 DIAGNOSIS — I10 PRIMARY HYPERTENSION: ICD-10-CM

## 2023-09-18 PROCEDURE — 99213 PR OFFICE/OUTPT VISIT, EST, LEVL III, 20-29 MIN: ICD-10-PCS | Mod: S$GLB,,, | Performed by: FAMILY MEDICINE

## 2023-09-18 PROCEDURE — 99213 OFFICE O/P EST LOW 20 MIN: CPT | Mod: S$GLB,,, | Performed by: FAMILY MEDICINE

## 2023-09-18 RX ORDER — DEXTROAMPHETAMINE SACCHARATE, AMPHETAMINE ASPARTATE, DEXTROAMPHETAMINE SULFATE AND AMPHETAMINE SULFATE 7.5; 7.5; 7.5; 7.5 MG/1; MG/1; MG/1; MG/1
30 TABLET ORAL 2 TIMES DAILY
Qty: 60 TABLET | Refills: 0 | Status: SHIPPED | OUTPATIENT
Start: 2023-10-17 | End: 2024-02-08

## 2023-09-18 RX ORDER — DEXTROAMPHETAMINE SACCHARATE, AMPHETAMINE ASPARTATE, DEXTROAMPHETAMINE SULFATE AND AMPHETAMINE SULFATE 7.5; 7.5; 7.5; 7.5 MG/1; MG/1; MG/1; MG/1
30 TABLET ORAL 2 TIMES DAILY
Qty: 60 TABLET | Refills: 0 | Status: SHIPPED | OUTPATIENT
Start: 2023-11-16 | End: 2024-02-08 | Stop reason: SDUPTHER

## 2023-09-18 RX ORDER — DEXTROAMPHETAMINE SACCHARATE, AMPHETAMINE ASPARTATE, DEXTROAMPHETAMINE SULFATE AND AMPHETAMINE SULFATE 7.5; 7.5; 7.5; 7.5 MG/1; MG/1; MG/1; MG/1
30 TABLET ORAL 2 TIMES DAILY
Qty: 60 TABLET | Refills: 0 | Status: SHIPPED | OUTPATIENT
Start: 2023-09-18 | End: 2024-02-08

## 2023-09-18 NOTE — PROGRESS NOTES
Subjective:       Patient ID: Gaston Bryant is a 38 y.o. male.    Chief Complaint: Follow-up (medication)      Review of patient's allergies indicates:  No Known Allergies   Needs meds refilled    Follow-up      Review of Systems   All other systems reviewed and are negative.        Objective:      Vitals:    09/18/23 1453   BP: (!) 160/78   Pulse: 71   Temp: 97.8 °F (36.6 °C)     Physical Exam  Vitals and nursing note reviewed.   Constitutional:       Appearance: Normal appearance.   HENT:      Head: Normocephalic and atraumatic.      Right Ear: Tympanic membrane normal.      Left Ear: Tympanic membrane normal.      Nose: Nose normal.      Mouth/Throat:      Mouth: Mucous membranes are moist.   Cardiovascular:      Rate and Rhythm: Normal rate and regular rhythm.      Pulses: Normal pulses.      Heart sounds: Normal heart sounds.   Pulmonary:      Effort: Pulmonary effort is normal.      Breath sounds: Normal breath sounds.   Abdominal:      General: Abdomen is flat. Bowel sounds are normal.      Palpations: Abdomen is soft.   Musculoskeletal:         General: Normal range of motion.      Cervical back: Normal range of motion and neck supple.   Skin:     General: Skin is warm and dry.   Neurological:      General: No focal deficit present.      Mental Status: He is alert.   Psychiatric:         Mood and Affect: Mood normal.         Assessment:       1. Attention deficit hyperactivity disorder (ADHD), predominantly inattentive type    2. Primary hypertension        Plan:       Attention deficit hyperactivity disorder (ADHD), predominantly inattentive type    Primary hypertension           Follow up in about 4 weeks (around 10/16/2023).

## 2023-09-21 DIAGNOSIS — I10 PRIMARY HYPERTENSION: ICD-10-CM

## 2023-09-26 ENCOUNTER — PATIENT OUTREACH (OUTPATIENT)
Dept: ADMINISTRATIVE | Facility: HOSPITAL | Age: 38
End: 2023-09-26
Payer: COMMERCIAL

## 2023-09-26 ENCOUNTER — PATIENT MESSAGE (OUTPATIENT)
Dept: ADMINISTRATIVE | Facility: HOSPITAL | Age: 38
End: 2023-09-26
Payer: COMMERCIAL

## 2023-09-26 NOTE — PROGRESS NOTES
Working MOP HTN report  Contacted to be scheduled to come in for a fu visit  Portal message sent

## 2024-01-05 ENCOUNTER — TELEPHONE (OUTPATIENT)
Dept: FAMILY MEDICINE | Facility: CLINIC | Age: 39
End: 2024-01-05
Payer: COMMERCIAL

## 2024-01-05 NOTE — TELEPHONE ENCOUNTER
----- Message from Viki Stockton sent at 1/5/2024  9:28 AM CST -----  Contact: [phone] Patient  Caller is requesting an earlier appointment then we can schedule.  Caller is requesting a message be sent to the provider.  If this is for urgent care symptoms, did you offer other providers at this location, providers at other locations, or [facility]? (yes, no, n/a):    If this is for the patients physical, did you offer to schedule next available and put on wait list, or to see NP or PA for their physical?  (yes, no, n/a):    When is the next available appointment with their provider:  03/2024  Reason for the appointment:  Med Refill  Patient preference of timeframe to be scheduled:  2/8/2024 - 2/9/2024  Would the patient like a call back, or a response through their [facility] portal?:   Call Back Please. Thank you  Comments:

## 2024-02-08 ENCOUNTER — OFFICE VISIT (OUTPATIENT)
Dept: FAMILY MEDICINE | Facility: CLINIC | Age: 39
End: 2024-02-08
Payer: COMMERCIAL

## 2024-02-08 VITALS
BODY MASS INDEX: 30.04 KG/M2 | SYSTOLIC BLOOD PRESSURE: 138 MMHG | HEART RATE: 71 BPM | WEIGHT: 209.81 LBS | DIASTOLIC BLOOD PRESSURE: 80 MMHG | HEIGHT: 70 IN | OXYGEN SATURATION: 99 %

## 2024-02-08 DIAGNOSIS — I10 PRIMARY HYPERTENSION: ICD-10-CM

## 2024-02-08 DIAGNOSIS — F90.0 ATTENTION DEFICIT HYPERACTIVITY DISORDER (ADHD), PREDOMINANTLY INATTENTIVE TYPE: Primary | ICD-10-CM

## 2024-02-08 PROCEDURE — 3075F SYST BP GE 130 - 139MM HG: CPT | Mod: S$GLB,,, | Performed by: FAMILY MEDICINE

## 2024-02-08 PROCEDURE — 99214 OFFICE O/P EST MOD 30 MIN: CPT | Mod: S$GLB,,, | Performed by: FAMILY MEDICINE

## 2024-02-08 PROCEDURE — 1159F MED LIST DOCD IN RCRD: CPT | Mod: S$GLB,,, | Performed by: FAMILY MEDICINE

## 2024-02-08 PROCEDURE — 80326 AMPHETAMINES 5 OR MORE: CPT | Performed by: FAMILY MEDICINE

## 2024-02-08 PROCEDURE — 3079F DIAST BP 80-89 MM HG: CPT | Mod: S$GLB,,, | Performed by: FAMILY MEDICINE

## 2024-02-08 PROCEDURE — 80355 GABAPENTIN NON-BLOOD: CPT | Performed by: FAMILY MEDICINE

## 2024-02-08 PROCEDURE — 80347 BENZODIAZEPINES 13 OR MORE: CPT | Performed by: FAMILY MEDICINE

## 2024-02-08 PROCEDURE — 3008F BODY MASS INDEX DOCD: CPT | Mod: S$GLB,,, | Performed by: FAMILY MEDICINE

## 2024-02-08 RX ORDER — DEXTROAMPHETAMINE SACCHARATE, AMPHETAMINE ASPARTATE, DEXTROAMPHETAMINE SULFATE AND AMPHETAMINE SULFATE 7.5; 7.5; 7.5; 7.5 MG/1; MG/1; MG/1; MG/1
30 TABLET ORAL 2 TIMES DAILY
Qty: 60 TABLET | Refills: 0 | Status: SHIPPED | OUTPATIENT
Start: 2024-02-08 | End: 2024-05-13

## 2024-02-08 RX ORDER — DEXTROAMPHETAMINE SACCHARATE, AMPHETAMINE ASPARTATE, DEXTROAMPHETAMINE SULFATE AND AMPHETAMINE SULFATE 7.5; 7.5; 7.5; 7.5 MG/1; MG/1; MG/1; MG/1
30 TABLET ORAL 2 TIMES DAILY
Qty: 60 TABLET | Refills: 0 | Status: SHIPPED | OUTPATIENT
Start: 2024-02-08 | End: 2024-02-08

## 2024-02-08 RX ORDER — DEXTROAMPHETAMINE SACCHARATE, AMPHETAMINE ASPARTATE, DEXTROAMPHETAMINE SULFATE AND AMPHETAMINE SULFATE 7.5; 7.5; 7.5; 7.5 MG/1; MG/1; MG/1; MG/1
30 TABLET ORAL 2 TIMES DAILY
Qty: 60 TABLET | Refills: 0 | Status: SHIPPED | OUTPATIENT
Start: 2024-04-06 | End: 2024-02-08

## 2024-02-08 RX ORDER — DEXTROAMPHETAMINE SACCHARATE, AMPHETAMINE ASPARTATE, DEXTROAMPHETAMINE SULFATE AND AMPHETAMINE SULFATE 7.5; 7.5; 7.5; 7.5 MG/1; MG/1; MG/1; MG/1
30 TABLET ORAL 2 TIMES DAILY
Qty: 60 TABLET | Refills: 0 | Status: SHIPPED | OUTPATIENT
Start: 2024-03-07 | End: 2024-02-08

## 2024-02-08 RX ORDER — DEXTROAMPHETAMINE SACCHARATE, AMPHETAMINE ASPARTATE, DEXTROAMPHETAMINE SULFATE AND AMPHETAMINE SULFATE 7.5; 7.5; 7.5; 7.5 MG/1; MG/1; MG/1; MG/1
30 TABLET ORAL 2 TIMES DAILY
Qty: 60 TABLET | Refills: 0 | Status: SHIPPED | OUTPATIENT
Start: 2024-03-07 | End: 2024-05-13

## 2024-02-08 RX ORDER — DEXTROAMPHETAMINE SACCHARATE, AMPHETAMINE ASPARTATE, DEXTROAMPHETAMINE SULFATE AND AMPHETAMINE SULFATE 7.5; 7.5; 7.5; 7.5 MG/1; MG/1; MG/1; MG/1
30 TABLET ORAL 2 TIMES DAILY
Qty: 60 TABLET | Refills: 0 | Status: SHIPPED | OUTPATIENT
Start: 2024-04-06 | End: 2024-05-13 | Stop reason: SDUPTHER

## 2024-02-08 NOTE — PROGRESS NOTES
"  [facility] [place] [place]    Family Medicine Office Visit    Chief Complaint   Patient presents with    Medication Refill    Follow-up     3 month f/u         HPI:  routine follow up chronic conditions:    ADD stable on medication - good compliance and good symptom control.  No issues with weight loss or insomnia  Blood Pressure at goal on medication, with patient reporting prescription compliance      ROS: as above    Vitals:    02/08/24 1342 02/08/24 1348   BP: (!) 140/80 138/80   BP Location: Right arm    Patient Position: Sitting    BP Method: Medium (Manual)    Pulse: 71    SpO2: 99%    Weight: 95.2 kg (209 lb 12.8 oz)    Height: 5' 10" (1.778 m)       Body mass index is 30.1 kg/m².      General:  AOx3, well nourished and developed in no acute distress  Eyes:  PERRLA, EOMI, vision intact grossly  ENT:  normal hearing, moist oral mucosa  Neck:  trachea midline with no masses or thyromegaly  Heart:  RRR, no murmurs.  No edema noted, extremities warm and well perfused  Lungs:  clear to auscultation bilaterally with symmetric chest movement  Abdomen:  Soft, nontender, nondistended.  Normal bowel sounds  Musculoskeletal:  Normal gait.  Normal posture.  Normal muscular development with no joint swelling.  Neurological:  CN II-XII grossly intact. Symmetric strength and sensation  Psych:  Normal mood and affect.  Able to demonstrate good judgement and personal insight.      Assessment/Plan:    1. Attention deficit hyperactivity disorder (ADHD), predominantly inattentive type    2. Primary hypertension       Stable, printed meds today  At goal        "

## 2024-02-12 LAB
6MAM UR QL: NOT DETECTED
7AMINOCLONAZEPAM UR QL: NOT DETECTED
A-OH ALPRAZ UR QL: NOT DETECTED
ALPHA-OH-MIDAZOLAM: NOT DETECTED
ALPRAZ UR QL: NOT DETECTED
AMPHET UR QL SCN: PRESENT
ANNOTATION COMMENT IMP: NORMAL
BARBITURATES UR QL: NEGATIVE
BUPRENORPHINE UR QL: NOT DETECTED
BZE UR QL: NEGATIVE
CARBOXYTHC UR QL: NEGATIVE
CARISOPRODOL UR QL: NEGATIVE
CLONAZEPAM UR QL: NOT DETECTED
CODEINE UR QL: NOT DETECTED
CREAT UR-MCNC: 204.6 MG/DL (ref 20–400)
DIAZEPAM UR QL: NOT DETECTED
ETHYL GLUCURONIDE UR QL: NORMAL
FENTANYL UR QL: NOT DETECTED
GABAPENTIN: NOT DETECTED
HYDROCODONE UR QL: NOT DETECTED
HYDROMORPHONE UR QL: NOT DETECTED
LORAZEPAM UR QL: NOT DETECTED
MDA UR QL: NOT DETECTED
MDEA UR QL: NOT DETECTED
MDMA UR QL: NOT DETECTED
ME-PHENIDATE UR QL: NOT DETECTED
METHADONE UR QL: NEGATIVE
METHAMPHET UR QL: NOT DETECTED
MIDAZOLAM UR QL SCN: NOT DETECTED
MORPHINE UR QL: NOT DETECTED
NALOXONE: NOT DETECTED
NORBUPRENORPHINE UR QL CFM: NOT DETECTED
NORDIAZEPAM UR QL: NOT DETECTED
NORFENTANYL UR QL: NOT DETECTED
NORHYDROCODONE UR QL CFM: NOT DETECTED
NORMEPERIDINE UR QL CFM: NOT DETECTED
NOROXYCODONE UR QL CFM: NOT DETECTED
NOROXYMORPHONE UR QL SCN: NOT DETECTED
OXAZEPAM UR QL: NOT DETECTED
OXYCODONE UR QL: NOT DETECTED
OXYMORPHONE UR QL: NOT DETECTED
PATHOLOGY STUDY: NORMAL
PCP UR QL: NEGATIVE
PHENTERMINE UR QL: NOT DETECTED
PREGABALIN: NOT DETECTED
SERVICE CMNT-IMP: NORMAL
TAPENTADOL UR QL SCN: NOT DETECTED
TAPENTADOL UR QL SCN: NOT DETECTED
TEMAZEPAM UR QL: NOT DETECTED
TRAMADOL UR QL: NEGATIVE
ZOLPIDEM METABOLITE: NOT DETECTED
ZOLPIDEM UR QL: NOT DETECTED

## 2024-02-22 ENCOUNTER — OFFICE VISIT (OUTPATIENT)
Dept: URGENT CARE | Facility: CLINIC | Age: 39
End: 2024-02-22
Payer: COMMERCIAL

## 2024-02-22 VITALS
RESPIRATION RATE: 18 BRPM | HEIGHT: 70 IN | OXYGEN SATURATION: 97 % | WEIGHT: 205 LBS | BODY MASS INDEX: 29.35 KG/M2 | DIASTOLIC BLOOD PRESSURE: 105 MMHG | TEMPERATURE: 98 F | HEART RATE: 94 BPM | SYSTOLIC BLOOD PRESSURE: 151 MMHG

## 2024-02-22 DIAGNOSIS — J06.9 BACTERIAL URI: Primary | ICD-10-CM

## 2024-02-22 DIAGNOSIS — R05.9 COUGH, UNSPECIFIED TYPE: ICD-10-CM

## 2024-02-22 DIAGNOSIS — J02.9 SORE THROAT: ICD-10-CM

## 2024-02-22 DIAGNOSIS — B96.89 BACTERIAL URI: Primary | ICD-10-CM

## 2024-02-22 LAB
CTP QC/QA: YES
SARS-COV-2 AG RESP QL IA.RAPID: NEGATIVE

## 2024-02-22 PROCEDURE — 87811 SARS-COV-2 COVID19 W/OPTIC: CPT | Mod: QW,S$GLB,, | Performed by: NURSE PRACTITIONER

## 2024-02-22 PROCEDURE — 99203 OFFICE O/P NEW LOW 30 MIN: CPT | Mod: S$GLB,,, | Performed by: NURSE PRACTITIONER

## 2024-02-22 RX ORDER — PREDNISONE 10 MG/1
TABLET ORAL
Qty: 8 TABLET | Refills: 0 | Status: SHIPPED | OUTPATIENT
Start: 2024-02-22

## 2024-02-22 RX ORDER — PROMETHAZINE HYDROCHLORIDE AND DEXTROMETHORPHAN HYDROBROMIDE 6.25; 15 MG/5ML; MG/5ML
5 SYRUP ORAL EVERY 4 HOURS PRN
Qty: 120 ML | Refills: 0 | Status: SHIPPED | OUTPATIENT
Start: 2024-02-22 | End: 2024-03-03

## 2024-02-22 RX ORDER — CEFDINIR 300 MG/1
300 CAPSULE ORAL 2 TIMES DAILY
Qty: 20 CAPSULE | Refills: 0 | Status: SHIPPED | OUTPATIENT
Start: 2024-02-22 | End: 2024-03-03

## 2024-02-22 NOTE — PROGRESS NOTES
"Subjective:      Patient ID: Gaston Bryant is a 38 y.o. male.    Vitals:  height is 5' 10" (1.778 m) and weight is 93 kg (205 lb). His oral temperature is 97.8 °F (36.6 °C). His blood pressure is 151/105 (abnormal) and his pulse is 94. His respiration is 18 and oxygen saturation is 97%.     Chief Complaint: Cough (/)    This is a 38 y.o. male who presents today with a chief complaint of  Sore throat, body aches, ear pain cough ( non productive) .     Symptoms start :  Tuesday        Home Treatment: Cold and flu medication     Sore Throat   The current episode started yesterday. The problem has been gradually improving. There has been no fever. The pain is at a severity of 3/10. Associated symptoms include congestion, coughing, headaches, a hoarse voice and a plugged ear sensation.       HENT:  Positive for congestion and sore throat.    Respiratory:  Positive for cough.    Neurological:  Positive for headaches.      Objective:     Physical Exam   Constitutional: He is oriented to person, place, and time. He appears well-developed.   HENT:   Head: Normocephalic and atraumatic.   Ears:   Right Ear: External ear normal. There is tenderness. Tympanic membrane is injected and bulging. A middle ear effusion is present.   Left Ear: External ear normal. There is tenderness. Tympanic membrane is injected and bulging. A middle ear effusion is present.   Nose: Rhinorrhea and congestion present.   Mouth/Throat: Mucous membranes are normal. Posterior oropharyngeal erythema present.   Eyes: Conjunctivae and lids are normal.   Neck: Trachea normal. Neck supple.   Cardiovascular: Normal rate, regular rhythm and normal heart sounds.   Pulmonary/Chest: Effort normal and breath sounds normal. No respiratory distress.   Abdominal: Normal appearance and bowel sounds are normal. He exhibits no distension and no mass. Soft. There is no abdominal tenderness.   Musculoskeletal: Normal range of motion.         General: Normal range of " motion.   Neurological: He is alert and oriented to person, place, and time. He has normal strength.   Skin: Skin is warm, dry, intact, not diaphoretic and not pale.   Psychiatric: His speech is normal and behavior is normal. Judgment and thought content normal.   Nursing note and vitals reviewed.    Results for orders placed or performed in visit on 02/22/24   SARS Coronavirus 2 Antigen, POCT Manual Read   Result Value Ref Range    SARS Coronavirus 2 Antigen Negative Negative     Acceptable Yes      No distress,  Assessment:     1. Bacterial URI    2. Sore throat    3. Cough, unspecified type        Plan:       Bacterial URI  -     predniSONE (DELTASONE) 10 MG tablet; Take 2 tabs today, then 1 tab po qd x 6 days  Dispense: 8 tablet; Refill: 0  -     cefdinir (OMNICEF) 300 MG capsule; Take 1 capsule (300 mg total) by mouth 2 (two) times daily. for 10 days  Dispense: 20 capsule; Refill: 0  -     promethazine-dextromethorphan (PROMETHAZINE-DM) 6.25-15 mg/5 mL Syrp; Take 5 mLs by mouth every 4 (four) hours as needed.  Dispense: 120 mL; Refill: 0    Sore throat  -     Cancel: POCT Strep A, Molecular    Cough, unspecified type  -     SARS Coronavirus 2 Antigen, POCT Manual Read

## 2024-02-22 NOTE — PATIENT INSTRUCTIONS
You must understand that you've received an Urgent Care treatment only and that you may be released before all your medical problems are known or treated. You, the patient, will arrange for follow up care as instructed.  Follow up with your PCP or specialty clinic as directed in the next 1-2 weeks if not improved or as needed.  You can call [phone] to schedule an appointment with the appropriate provider.  If your condition worsens we recommend that you receive another evaluation at the emergency room immediately or contact your primary medical clinics after hours call service to discuss your concerns.  Please return here or go to the Emergency Department for any concerns or worsening of condition.  Please if you smoke please consider quitting. [facility] Smoke cessation hotline number is [phone], available at this number is free counseling and medications to live a healthier life!       If you were prescribed a narcotic or controlled medication, do not drive or operate heavy equipment or machinery while taking these medications.    If you were not prescribed an antibiotic and your not better please return for a recheck. Antibiotic therapy is not always indicated initially.   Please attempt over the counter medications, give it time and try Echinacea, Zinc and Vitamin C to fight common colds and virus.

## 2024-05-08 ENCOUNTER — PATIENT MESSAGE (OUTPATIENT)
Dept: FAMILY MEDICINE | Facility: CLINIC | Age: 39
End: 2024-05-08
Payer: COMMERCIAL

## 2024-05-13 ENCOUNTER — OFFICE VISIT (OUTPATIENT)
Dept: FAMILY MEDICINE | Facility: CLINIC | Age: 39
End: 2024-05-13
Payer: COMMERCIAL

## 2024-05-13 DIAGNOSIS — F90.0 ATTENTION DEFICIT HYPERACTIVITY DISORDER (ADHD), PREDOMINANTLY INATTENTIVE TYPE: Primary | ICD-10-CM

## 2024-05-13 DIAGNOSIS — I10 PRIMARY HYPERTENSION: ICD-10-CM

## 2024-05-13 PROCEDURE — 99213 OFFICE O/P EST LOW 20 MIN: CPT | Mod: 95,,, | Performed by: FAMILY MEDICINE

## 2024-05-13 RX ORDER — DEXTROAMPHETAMINE SACCHARATE, AMPHETAMINE ASPARTATE, DEXTROAMPHETAMINE SULFATE AND AMPHETAMINE SULFATE 7.5; 7.5; 7.5; 7.5 MG/1; MG/1; MG/1; MG/1
30 TABLET ORAL 2 TIMES DAILY
Qty: 60 TABLET | Refills: 0 | Status: SHIPPED | OUTPATIENT
Start: 2024-05-13

## 2024-05-13 RX ORDER — DEXTROAMPHETAMINE SACCHARATE, AMPHETAMINE ASPARTATE, DEXTROAMPHETAMINE SULFATE AND AMPHETAMINE SULFATE 7.5; 7.5; 7.5; 7.5 MG/1; MG/1; MG/1; MG/1
30 TABLET ORAL 2 TIMES DAILY
Qty: 60 TABLET | Refills: 0 | Status: SHIPPED | OUTPATIENT
Start: 2024-07-13

## 2024-05-13 RX ORDER — DEXTROAMPHETAMINE SACCHARATE, AMPHETAMINE ASPARTATE, DEXTROAMPHETAMINE SULFATE AND AMPHETAMINE SULFATE 7.5; 7.5; 7.5; 7.5 MG/1; MG/1; MG/1; MG/1
30 TABLET ORAL 2 TIMES DAILY
Qty: 60 TABLET | Refills: 0 | Status: SHIPPED | OUTPATIENT
Start: 2024-06-12

## 2024-05-13 NOTE — PROGRESS NOTES
Chief Complaint   Patient presents with    Follow-up        HPI:  virtual ADD followup.  Doing well.  No insomnia or weight loss.  Good focus and attention    ROS: as above      General:  AOx3, well nourished and developed in no acute distress  Eyes:  PERRLA, EOMI, vision intact grossly  Neck:  trachea midline with no masses or thyromegaly  Musculoskeletal:  Normal posture.  Normal muscular development.  Neurological:  CN II-XII grossly intact based off of video interaction  Psych:  Normal mood and affect.  Able to demonstrate good judgement and personal insight.      Assessment/Plan:    Attention deficit hyperactivity disorder (ADHD), predominantly inattentive type    Primary hypertension     Continue medical therapy as directed      The patient location is: home  The chief complaint leading to consultation is: med refill    Visit type: audiovisual    Face to Face time with patient: 15  15 minutes of total time spent on the encounter, which includes face to face time and non-face to face time preparing to see the patient (eg, review of tests), Obtaining and/or reviewing separately obtained history, Documenting clinical information in the electronic or other health record, Independently interpreting results (not separately reported) and communicating results to the patient/family/caregiver, or Care coordination (not separately reported).         Each patient to whom he or she provides medical services by telemedicine is:  (1) informed of the relationship between the physician and patient and the respective role of any other health care provider with respect to management of the patient; and (2) notified that he or she may decline to receive medical services by telemedicine and may withdraw from such care at any time.    Notes:

## 2024-08-07 ENCOUNTER — OFFICE VISIT (OUTPATIENT)
Dept: FAMILY MEDICINE | Facility: CLINIC | Age: 39
End: 2024-08-07
Payer: COMMERCIAL

## 2024-08-07 VITALS
WEIGHT: 203.19 LBS | HEART RATE: 60 BPM | SYSTOLIC BLOOD PRESSURE: 132 MMHG | HEIGHT: 70 IN | BODY MASS INDEX: 29.09 KG/M2 | DIASTOLIC BLOOD PRESSURE: 80 MMHG | OXYGEN SATURATION: 99 %

## 2024-08-07 DIAGNOSIS — F90.0 ATTENTION DEFICIT HYPERACTIVITY DISORDER (ADHD), PREDOMINANTLY INATTENTIVE TYPE: Primary | ICD-10-CM

## 2024-08-07 DIAGNOSIS — I10 PRIMARY HYPERTENSION: ICD-10-CM

## 2024-08-07 PROCEDURE — 3075F SYST BP GE 130 - 139MM HG: CPT | Mod: S$GLB,,, | Performed by: FAMILY MEDICINE

## 2024-08-07 PROCEDURE — 99999 PR PBB SHADOW E&M-EST. PATIENT-LVL III: CPT | Mod: PBBFAC,,, | Performed by: FAMILY MEDICINE

## 2024-08-07 PROCEDURE — 1159F MED LIST DOCD IN RCRD: CPT | Mod: S$GLB,,, | Performed by: FAMILY MEDICINE

## 2024-08-07 PROCEDURE — G2211 COMPLEX E/M VISIT ADD ON: HCPCS | Mod: S$GLB,,, | Performed by: FAMILY MEDICINE

## 2024-08-07 PROCEDURE — 99214 OFFICE O/P EST MOD 30 MIN: CPT | Mod: S$GLB,,, | Performed by: FAMILY MEDICINE

## 2024-08-07 PROCEDURE — 3008F BODY MASS INDEX DOCD: CPT | Mod: S$GLB,,, | Performed by: FAMILY MEDICINE

## 2024-08-07 PROCEDURE — 3079F DIAST BP 80-89 MM HG: CPT | Mod: S$GLB,,, | Performed by: FAMILY MEDICINE

## 2024-08-07 RX ORDER — DEXTROAMPHETAMINE SACCHARATE, AMPHETAMINE ASPARTATE, DEXTROAMPHETAMINE SULFATE AND AMPHETAMINE SULFATE 7.5; 7.5; 7.5; 7.5 MG/1; MG/1; MG/1; MG/1
30 TABLET ORAL 2 TIMES DAILY
Qty: 60 TABLET | Refills: 0 | Status: SHIPPED | OUTPATIENT
Start: 2024-09-06

## 2024-08-07 RX ORDER — DEXTROAMPHETAMINE SACCHARATE, AMPHETAMINE ASPARTATE, DEXTROAMPHETAMINE SULFATE AND AMPHETAMINE SULFATE 7.5; 7.5; 7.5; 7.5 MG/1; MG/1; MG/1; MG/1
30 TABLET ORAL 2 TIMES DAILY
Qty: 60 TABLET | Refills: 0 | Status: SHIPPED | OUTPATIENT
Start: 2024-08-07

## 2024-08-07 RX ORDER — DEXTROAMPHETAMINE SACCHARATE, AMPHETAMINE ASPARTATE, DEXTROAMPHETAMINE SULFATE AND AMPHETAMINE SULFATE 7.5; 7.5; 7.5; 7.5 MG/1; MG/1; MG/1; MG/1
30 TABLET ORAL 2 TIMES DAILY
Qty: 60 TABLET | Refills: 0 | Status: SHIPPED | OUTPATIENT
Start: 2024-10-05

## 2024-11-08 ENCOUNTER — OFFICE VISIT (OUTPATIENT)
Dept: FAMILY MEDICINE | Facility: CLINIC | Age: 39
End: 2024-11-08
Payer: COMMERCIAL

## 2024-11-08 VITALS
DIASTOLIC BLOOD PRESSURE: 84 MMHG | BODY MASS INDEX: 27.7 KG/M2 | OXYGEN SATURATION: 98 % | SYSTOLIC BLOOD PRESSURE: 108 MMHG | HEART RATE: 66 BPM | WEIGHT: 193.5 LBS | HEIGHT: 70 IN

## 2024-11-08 DIAGNOSIS — I10 PRIMARY HYPERTENSION: Primary | ICD-10-CM

## 2024-11-08 DIAGNOSIS — F90.0 ATTENTION DEFICIT HYPERACTIVITY DISORDER (ADHD), PREDOMINANTLY INATTENTIVE TYPE: ICD-10-CM

## 2024-11-08 RX ORDER — DEXTROAMPHETAMINE SACCHARATE, AMPHETAMINE ASPARTATE, DEXTROAMPHETAMINE SULFATE AND AMPHETAMINE SULFATE 7.5; 7.5; 7.5; 7.5 MG/1; MG/1; MG/1; MG/1
30 TABLET ORAL 2 TIMES DAILY
Qty: 60 TABLET | Refills: 0 | Status: SHIPPED | OUTPATIENT
Start: 2024-12-07

## 2024-11-08 RX ORDER — DEXTROAMPHETAMINE SACCHARATE, AMPHETAMINE ASPARTATE, DEXTROAMPHETAMINE SULFATE AND AMPHETAMINE SULFATE 7.5; 7.5; 7.5; 7.5 MG/1; MG/1; MG/1; MG/1
30 TABLET ORAL 2 TIMES DAILY
Qty: 60 TABLET | Refills: 0 | Status: SHIPPED | OUTPATIENT
Start: 2025-01-06

## 2024-11-08 RX ORDER — DEXTROAMPHETAMINE SACCHARATE, AMPHETAMINE ASPARTATE, DEXTROAMPHETAMINE SULFATE AND AMPHETAMINE SULFATE 7.5; 7.5; 7.5; 7.5 MG/1; MG/1; MG/1; MG/1
30 TABLET ORAL 2 TIMES DAILY
Qty: 60 TABLET | Refills: 0 | Status: SHIPPED | OUTPATIENT
Start: 2024-11-08

## 2024-11-08 NOTE — PROGRESS NOTES
"    [facility] - [place], [place]    Family Medicine Office Visit    Chief Complaint   Patient presents with    Follow-up     3 month f/u         HPI:  routine followup for chronic conditions management:    Blood Pressure at goal now without medication us  ADHD stable with judicious medication use    Down 10lbs      ROS: as above    Vitals:    11/08/24 0725   BP: 108/84   BP Location: Right arm   Patient Position: Sitting   Pulse: 66   SpO2: 98%   Weight: 87.8 kg (193 lb 8 oz)   Height: 5' 10" (1.778 m)      Body mass index is 27.76 kg/m².      General:  AOx3, well nourished and developed in no acute distress  Eyes:  PERRLA, EOMI, vision intact grossly  ENT:  normal hearing, moist oral mucosa  Neck:  trachea midline with no masses or thyromegaly  Heart:  RRR, no murmurs.  No edema noted, extremities warm and well perfused  Lungs:  clear to auscultation bilaterally with symmetric chest movement  Abdomen:  Soft, nontender, nondistended.  Normal bowel sounds  Musculoskeletal:  Normal gait.  Normal posture.  Normal muscular development with no joint swelling.  Neurological:  CN II-XII grossly intact. Symmetric strength and sensation  Psych:  Normal mood and affect.  Able to demonstrate good judgement and personal insight.      Assessment/Plan:    1. Primary hypertension    2. Attention deficit hyperactivity disorder (ADHD), predominantly inattentive type         Resolved  Stable, continue therapy    Visit today included increased complexity associated with the care of the episodic problem ADD addressed and managing the longitudinal care of the patient due to the serious and/or complex managed problem(s) ADD.      "

## 2024-11-08 NOTE — PATIENT INSTRUCTIONS
Amazing job on lifestyle change - blood pressure is now normal    Swelling in groin is not cause for worry - either an accumulation of fat, or small hernia.  Just continue to monitor    Filled medication for 3 months

## 2024-11-22 DIAGNOSIS — I10 PRIMARY HYPERTENSION: ICD-10-CM

## 2025-04-14 ENCOUNTER — HOSPITAL ENCOUNTER (OUTPATIENT)
Dept: RADIOLOGY | Facility: HOSPITAL | Age: 40
Discharge: HOME | End: 2025-04-14
Attending: FAMILY MEDICINE
Payer: COMMERCIAL

## 2025-04-14 ENCOUNTER — OFFICE VISIT (OUTPATIENT)
Dept: FAMILY MEDICINE | Facility: CLINIC | Age: 40
End: 2025-04-14
Payer: COMMERCIAL

## 2025-04-14 VITALS
HEART RATE: 66 BPM | OXYGEN SATURATION: 99 % | DIASTOLIC BLOOD PRESSURE: 78 MMHG | WEIGHT: 192.38 LBS | SYSTOLIC BLOOD PRESSURE: 118 MMHG | BODY MASS INDEX: 27.54 KG/M2 | HEIGHT: 70 IN

## 2025-04-14 DIAGNOSIS — Z00.00 ANNUAL PHYSICAL EXAM: Primary | ICD-10-CM

## 2025-04-14 DIAGNOSIS — Z00.00 ANNUAL PHYSICAL EXAM: ICD-10-CM

## 2025-04-14 DIAGNOSIS — F90.0 ATTENTION DEFICIT HYPERACTIVITY DISORDER (ADHD), PREDOMINANTLY INATTENTIVE TYPE: ICD-10-CM

## 2025-04-14 PROCEDURE — 71046 X-RAY EXAM CHEST 2 VIEWS: CPT | Mod: TC

## 2025-04-14 PROCEDURE — 86593 SYPHILIS TEST NON-TREP QUANT: CPT | Performed by: FAMILY MEDICINE

## 2025-04-14 PROCEDURE — 82570 ASSAY OF URINE CREATININE: CPT | Performed by: FAMILY MEDICINE

## 2025-04-14 PROCEDURE — 87389 HIV-1 AG W/HIV-1&-2 AB AG IA: CPT | Performed by: FAMILY MEDICINE

## 2025-04-14 PROCEDURE — 71046 X-RAY EXAM CHEST 2 VIEWS: CPT | Mod: 26,,, | Performed by: RADIOLOGY

## 2025-04-14 RX ORDER — DEXTROAMPHETAMINE SACCHARATE, AMPHETAMINE ASPARTATE, DEXTROAMPHETAMINE SULFATE AND AMPHETAMINE SULFATE 7.5; 7.5; 7.5; 7.5 MG/1; MG/1; MG/1; MG/1
30 TABLET ORAL 2 TIMES DAILY
Qty: 60 TABLET | Refills: 0 | Status: SHIPPED | OUTPATIENT
Start: 2025-04-14

## 2025-04-14 NOTE — PROGRESS NOTES
"    [facility]  [facility] - [place], [place]    Family Medicine Office Visit    Chief Complaint   Patient presents with    Annual Exam        HPI:  Annual exam.  Needs exam as may be traveling to [facility].    ADHD stable    ROS: as  above    Vitals:    04/14/25 0855   BP: 118/78   BP Location: Right arm   Patient Position: Sitting   Pulse: 66   SpO2: 99%   Weight: 87.3 kg (192 lb 6.4 oz)   Height: 5' 10" (1.778 m)      Body mass index is 27.61 kg/m².      General:  AOx3, well nourished and developed in no acute distress  Eyes:  PERRLA, EOMI, vision intact grossly  ENT:  normal hearing, moist oral mucosa  Neck:  trachea midline with no masses or thyromegaly  Heart:  RRR, no murmurs.  No edema noted, extremities warm and well perfused  Lungs:  clear to auscultation bilaterally with symmetric chest movement  Abdomen:  Soft, nontender, nondistended.  Normal bowel sounds  Musculoskeletal:  Normal gait.  Normal posture.  Normal muscular development with no joint swelling.  Neurological:  CN II-XII grossly intact. Symmetric strength and sensation  Psych:  Normal mood and affect.  Able to demonstrate good judgement and personal insight.      Assessment/Plan:    1. Annual physical exam    2. Attention deficit hyperactivity disorder (ADHD), predominantly inattentive type       Stable, get labs, urine, and xray as will need work VISA for travel  Stable, filled medication today    Visit today included increased complexity associated with the care of the episodic problem annual addressed and managing the longitudinal care of the patient due to the serious and/or complex managed problem(s) annual.        "

## 2025-04-14 NOTE — PATIENT INSTRUCTIONS
Get blood and urine testing  Get xray at [place] office    Will order any other labs that may be due    Filled medication

## 2025-04-15 ENCOUNTER — RESULTS FOLLOW-UP (OUTPATIENT)
Dept: FAMILY MEDICINE | Facility: CLINIC | Age: 40
End: 2025-04-15

## 2025-05-09 ENCOUNTER — OFFICE VISIT (OUTPATIENT)
Dept: URGENT CARE | Facility: CLINIC | Age: 40
End: 2025-05-09
Payer: COMMERCIAL

## 2025-05-09 VITALS
OXYGEN SATURATION: 99 % | DIASTOLIC BLOOD PRESSURE: 82 MMHG | SYSTOLIC BLOOD PRESSURE: 150 MMHG | HEIGHT: 70 IN | BODY MASS INDEX: 26.48 KG/M2 | HEART RATE: 86 BPM | WEIGHT: 185 LBS | TEMPERATURE: 98 F | RESPIRATION RATE: 18 BRPM

## 2025-05-09 DIAGNOSIS — B96.89 BACTERIAL SINUSITIS: Primary | ICD-10-CM

## 2025-05-09 DIAGNOSIS — J32.9 BACTERIAL SINUSITIS: Primary | ICD-10-CM

## 2025-05-09 PROCEDURE — 99214 OFFICE O/P EST MOD 30 MIN: CPT | Mod: S$GLB,,, | Performed by: NURSE PRACTITIONER

## 2025-05-09 RX ORDER — AZITHROMYCIN 250 MG/1
TABLET, FILM COATED ORAL
Qty: 6 TABLET | Refills: 0 | Status: SHIPPED | OUTPATIENT
Start: 2025-05-09

## 2025-05-09 RX ORDER — IPRATROPIUM BROMIDE 21 UG/1
2 SPRAY, METERED NASAL DAILY
Qty: 30 ML | Refills: 0 | Status: SHIPPED | OUTPATIENT
Start: 2025-05-09 | End: 2025-05-16

## 2025-05-09 RX ORDER — PROMETHAZINE HYDROCHLORIDE AND DEXTROMETHORPHAN HYDROBROMIDE 6.25; 15 MG/5ML; MG/5ML
5 SYRUP ORAL
Qty: 118 ML | Refills: 0 | Status: SHIPPED | OUTPATIENT
Start: 2025-05-09 | End: 2025-05-19

## 2025-05-09 NOTE — PROGRESS NOTES
"Subjective:       Patient ID: Gaston Bryant is a 40 y.o. male.    Vitals:  height is 5' 10" (1.778 m) and weight is 83.9 kg (185 lb). His oral temperature is 97.6 °F (36.4 °C). His blood pressure is 150/82 (abnormal) and his pulse is 86. His respiration is 18 and oxygen saturation is 99%.     Chief Complaint: Sinus Problem    40-year-old afebrile male who presents today with chief complaint of sinus congestion, sinus pressure, rhinorrhea, intermittent bilateral frontal headaches, and nonproductive cough for the past several days.  Patient reports that he has a history of sinus infections and states that his current symptoms are identical to the symptoms he had with previous sinus infections.    Sinus Problem  This is a new problem. The current episode started in the past 7 days. The problem is unchanged. There has been no fever. His pain is at a severity of 0/10. He is experiencing no pain. Associated symptoms include congestion, coughing, sinus pressure and sneezing. Past treatments include oral decongestants. The treatment provided no relief.       HENT:  Positive for congestion and sinus pressure.    Respiratory:  Positive for cough.    Skin:  Negative for erythema.   Allergic/Immunologic: Positive for sneezing.           Objective:      Physical Exam   Constitutional: He is oriented to person, place, and time.  Non-toxic appearance. He does not appear ill. No distress. normal  HENT:   Head: Normocephalic and atraumatic.   Ears:   Right Ear: Tympanic membrane, external ear and ear canal normal.   Left Ear: Tympanic membrane, external ear and ear canal normal.   Nose: Congestion present. Right sinus exhibits maxillary sinus tenderness. Left sinus exhibits maxillary sinus tenderness.   Mouth/Throat: Mucous membranes are moist. No posterior oropharyngeal erythema. Oropharynx is clear.   Eyes: Conjunctivae are normal. Extraocular movement intact   Neck: Neck supple. No neck rigidity present.   Cardiovascular: " Normal rate, regular rhythm, normal heart sounds and normal pulses.   Pulmonary/Chest: Effort normal and breath sounds normal.   Abdominal: Normal appearance.   Musculoskeletal: Normal range of motion.         General: Normal range of motion.      Cervical back: He exhibits no tenderness.   Lymphadenopathy:     He has no cervical adenopathy.   Neurological: He is alert and oriented to person, place, and time.   Skin: Skin is warm, dry, not diaphoretic, not pale and no rash. No bruising, No erythema and No lesion no jaundice  Psychiatric: His behavior is normal.   Vitals reviewed.        Past medical history and current medications reviewed.       Assessment:           1. Bacterial sinusitis              Plan:         Bacterial sinusitis  -     azithromycin (Z-CHRISTOPHER) 250 MG tablet; Take 2 tablets by mouth on day 1; Take 1 tablet by mouth on days 2-5  Dispense: 6 tablet; Refill: 0  -     promethazine-dextromethorphan (PROMETHAZINE-DM) 6.25-15 mg/5 mL Syrp; Take 5 mLs by mouth every 4 to 6 hours as needed (Cough).  Dispense: 118 mL; Refill: 0  -     ipratropium (ATROVENT) 21 mcg (0.03 %) nasal spray; 2 sprays by Each Nostril route once daily. for 7 days  Dispense: 30 mL; Refill: 0         INSTRUCTIONS  Medication as prescribed.  Rest.  Increase oral fluids.  Follow up with primary care as advised.  In the meantime, return here or go to ER for worsening of symptoms, or for any new symptoms as discussed.

## 2025-06-10 DIAGNOSIS — F90.0 ATTENTION DEFICIT HYPERACTIVITY DISORDER (ADHD), PREDOMINANTLY INATTENTIVE TYPE: ICD-10-CM

## 2025-06-10 NOTE — TELEPHONE ENCOUNTER
Last office visit: 4/14/25  Upcoming Appointment: 10/15/25  Type:  RX Refill Request    Who Called: pt     Refill or New Rx:refill     RX Name and Strength:dextroamphetamine-amphetamine 30 mg Tab    How is the patient currently taking it? (ex. 1XDay):as directed     Is this a 30 day or 90 day RX:90    Preferred Pharmacy with phone number:  [facility] [place] - [address] [address]  Phone: [phone] Fax: [phone]      Local or Mail Order:local     Ordering Provider:Erick     Would the patient rather a call back or a response via [facility]? No     Best Call Back Number:[phone] pt     Additional Information:

## 2025-06-10 NOTE — TELEPHONE ENCOUNTER
No care due was identified.  [facility] Embedded Care Due Messages. Reference number: [id number].   6/10/2025 2:53:47 PM CDT

## 2025-06-12 RX ORDER — DEXTROAMPHETAMINE SACCHARATE, AMPHETAMINE ASPARTATE, DEXTROAMPHETAMINE SULFATE AND AMPHETAMINE SULFATE 7.5; 7.5; 7.5; 7.5 MG/1; MG/1; MG/1; MG/1
30 TABLET ORAL 2 TIMES DAILY
Qty: 60 TABLET | Refills: 0 | Status: SHIPPED | OUTPATIENT
Start: 2025-06-12